# Patient Record
Sex: FEMALE | Race: WHITE | Employment: FULL TIME | URBAN - METROPOLITAN AREA
[De-identification: names, ages, dates, MRNs, and addresses within clinical notes are randomized per-mention and may not be internally consistent; named-entity substitution may affect disease eponyms.]

---

## 2018-03-17 ENCOUNTER — HOSPITAL ENCOUNTER (EMERGENCY)
Age: 53
Discharge: HOME | End: 2018-03-17
Attending: EMERGENCY MEDICINE
Payer: MEDICAID

## 2018-03-17 ENCOUNTER — APPOINTMENT (OUTPATIENT)
Dept: CT IMAGING | Age: 53
End: 2018-03-17
Attending: EMERGENCY MEDICINE
Payer: MEDICAID

## 2018-03-17 VITALS
HEIGHT: 69 IN | DIASTOLIC BLOOD PRESSURE: 64 MMHG | RESPIRATION RATE: 16 BRPM | SYSTOLIC BLOOD PRESSURE: 110 MMHG | TEMPERATURE: 98.6 F | BODY MASS INDEX: 30.76 KG/M2 | OXYGEN SATURATION: 98 % | HEART RATE: 98 BPM | WEIGHT: 207.67 LBS

## 2018-03-17 DIAGNOSIS — R10.31 RLQ ABDOMINAL PAIN: Primary | ICD-10-CM

## 2018-03-17 LAB
AMORPH CRY URNS QL MICRO: ABNORMAL
ANION GAP SERPL CALC-SCNC: 8 MMOL/L (ref 5–15)
APPEARANCE UR: ABNORMAL
BACTERIA URNS QL MICRO: ABNORMAL /HPF
BASOPHILS # BLD: 0 K/UL (ref 0–0.1)
BASOPHILS NFR BLD: 0 % (ref 0–1)
BILIRUB UR QL CFM: NEGATIVE
BUN SERPL-MCNC: 18 MG/DL (ref 6–20)
BUN/CREAT SERPL: 19 (ref 12–20)
CALCIUM SERPL-MCNC: 9.1 MG/DL (ref 8.5–10.1)
CHLORIDE SERPL-SCNC: 108 MMOL/L (ref 97–108)
CO2 SERPL-SCNC: 25 MMOL/L (ref 21–32)
COLOR UR: ABNORMAL
CREAT BLD-MCNC: 0.9 MG/DL (ref 0.6–1.3)
CREAT SERPL-MCNC: 0.95 MG/DL (ref 0.55–1.02)
DIFFERENTIAL METHOD BLD: ABNORMAL
EOSINOPHIL # BLD: 0.2 K/UL (ref 0–0.4)
EOSINOPHIL NFR BLD: 3 % (ref 0–7)
EPITH CASTS URNS QL MICRO: ABNORMAL /LPF
ERYTHROCYTE [DISTWIDTH] IN BLOOD BY AUTOMATED COUNT: 12 % (ref 11.5–14.5)
GLUCOSE SERPL-MCNC: 125 MG/DL (ref 65–100)
GLUCOSE UR STRIP.AUTO-MCNC: NEGATIVE MG/DL
HCT VFR BLD AUTO: 42.1 % (ref 35–47)
HGB BLD-MCNC: 14.4 G/DL (ref 11.5–16)
HGB UR QL STRIP: NEGATIVE
HYALINE CASTS URNS QL MICRO: ABNORMAL /LPF (ref 0–5)
IMM GRANULOCYTES # BLD: 0 K/UL (ref 0–0.04)
IMM GRANULOCYTES NFR BLD AUTO: 1 % (ref 0–0.5)
KETONES UR QL STRIP.AUTO: NEGATIVE MG/DL
LEUKOCYTE ESTERASE UR QL STRIP.AUTO: NEGATIVE
LYMPHOCYTES # BLD: 2.2 K/UL (ref 0.8–3.5)
LYMPHOCYTES NFR BLD: 29 % (ref 12–49)
MCH RBC QN AUTO: 32.1 PG (ref 26–34)
MCHC RBC AUTO-ENTMCNC: 34.2 G/DL (ref 30–36.5)
MCV RBC AUTO: 94 FL (ref 80–99)
MONOCYTES # BLD: 0.6 K/UL (ref 0–1)
MONOCYTES NFR BLD: 7 % (ref 5–13)
MUCOUS THREADS URNS QL MICRO: ABNORMAL /LPF
NEUTS SEG # BLD: 4.7 K/UL (ref 1.8–8)
NEUTS SEG NFR BLD: 60 % (ref 32–75)
NITRITE UR QL STRIP.AUTO: NEGATIVE
NRBC # BLD: 0 K/UL (ref 0–0.01)
NRBC BLD-RTO: 0 PER 100 WBC
PH UR STRIP: 5.5 [PH] (ref 5–8)
PLATELET # BLD AUTO: 271 K/UL (ref 150–400)
PMV BLD AUTO: 9.9 FL (ref 8.9–12.9)
POTASSIUM SERPL-SCNC: 3.7 MMOL/L (ref 3.5–5.1)
PROT UR STRIP-MCNC: ABNORMAL MG/DL
RBC # BLD AUTO: 4.48 M/UL (ref 3.8–5.2)
RBC #/AREA URNS HPF: ABNORMAL /HPF (ref 0–5)
SODIUM SERPL-SCNC: 141 MMOL/L (ref 136–145)
SP GR UR REFRACTOMETRY: 1.03 (ref 1–1.03)
UA: UC IF INDICATED,UAUC: ABNORMAL
UROBILINOGEN UR QL STRIP.AUTO: 0.2 EU/DL (ref 0.2–1)
WBC # BLD AUTO: 7.7 K/UL (ref 3.6–11)
WBC URNS QL MICRO: ABNORMAL /HPF (ref 0–4)

## 2018-03-17 PROCEDURE — 96374 THER/PROPH/DIAG INJ IV PUSH: CPT

## 2018-03-17 PROCEDURE — 82565 ASSAY OF CREATININE: CPT

## 2018-03-17 PROCEDURE — 85025 COMPLETE CBC W/AUTO DIFF WBC: CPT | Performed by: EMERGENCY MEDICINE

## 2018-03-17 PROCEDURE — 74011636320 HC RX REV CODE- 636/320: Performed by: EMERGENCY MEDICINE

## 2018-03-17 PROCEDURE — 36415 COLL VENOUS BLD VENIPUNCTURE: CPT | Performed by: EMERGENCY MEDICINE

## 2018-03-17 PROCEDURE — 74177 CT ABD & PELVIS W/CONTRAST: CPT

## 2018-03-17 PROCEDURE — 87086 URINE CULTURE/COLONY COUNT: CPT | Performed by: EMERGENCY MEDICINE

## 2018-03-17 PROCEDURE — 99284 EMERGENCY DEPT VISIT MOD MDM: CPT

## 2018-03-17 PROCEDURE — 81001 URINALYSIS AUTO W/SCOPE: CPT | Performed by: EMERGENCY MEDICINE

## 2018-03-17 PROCEDURE — 80048 BASIC METABOLIC PNL TOTAL CA: CPT | Performed by: EMERGENCY MEDICINE

## 2018-03-17 PROCEDURE — 74011250636 HC RX REV CODE- 250/636: Performed by: EMERGENCY MEDICINE

## 2018-03-17 RX ORDER — FENTANYL CITRATE 50 UG/ML
50 INJECTION, SOLUTION INTRAMUSCULAR; INTRAVENOUS
Status: COMPLETED | OUTPATIENT
Start: 2018-03-17 | End: 2018-03-17

## 2018-03-17 RX ORDER — NAPROXEN 500 MG/1
500 TABLET ORAL
Qty: 20 TAB | Refills: 0 | Status: SHIPPED | OUTPATIENT
Start: 2018-03-17 | End: 2018-10-03

## 2018-03-17 RX ORDER — SODIUM CHLORIDE 0.9 % (FLUSH) 0.9 %
10 SYRINGE (ML) INJECTION
Status: COMPLETED | OUTPATIENT
Start: 2018-03-17 | End: 2018-03-17

## 2018-03-17 RX ORDER — SODIUM CHLORIDE 9 MG/ML
50 INJECTION, SOLUTION INTRAVENOUS
Status: COMPLETED | OUTPATIENT
Start: 2018-03-17 | End: 2018-03-17

## 2018-03-17 RX ORDER — DICYCLOMINE HYDROCHLORIDE 10 MG/1
10 CAPSULE ORAL 4 TIMES DAILY
Qty: 20 CAP | Refills: 0 | Status: SHIPPED | OUTPATIENT
Start: 2018-03-17 | End: 2018-03-22

## 2018-03-17 RX ADMIN — SODIUM CHLORIDE 50 ML/HR: 900 INJECTION, SOLUTION INTRAVENOUS at 13:39

## 2018-03-17 RX ADMIN — FENTANYL CITRATE 50 MCG: 50 INJECTION, SOLUTION INTRAMUSCULAR; INTRAVENOUS at 14:16

## 2018-03-17 RX ADMIN — IOPAMIDOL 100 ML: 755 INJECTION, SOLUTION INTRAVENOUS at 13:39

## 2018-03-17 RX ADMIN — Medication 10 ML: at 13:39

## 2018-03-17 NOTE — ED PROVIDER NOTES
EMERGENCY DEPARTMENT HISTORY AND PHYSICAL EXAM      Date: 3/17/2018  Patient Name: Naveen Nolasco    History of Presenting Illness     Chief Complaint   Patient presents with    Abdominal Pain     pt reports to ed complaining of right lower quandrant pain onset this morning, no nausea       History Provided By: Patient    HPI: Naveen Nolasco, 50 y.o. female with PMHx significant for HTN and kidney stone, presents ambulatory to the ED with cc of 3/10, sharp, intermittent, RLQ abdominal pain since this morning at 8:00 AM. Pt notes that she took Hydrocodone with no relief of her symptoms. Pt explains that she has been having worsening RLQ abdominal since this morning upon waking. Pt reports that she has a Hx of a kidney stone, but she is unable to recall if she passed the stone. Pt notes that she also has a Hx of an ovarian cyst which ruptured several years ago. Pt specifically denies fever, N/V, diarrhea, constipation, or urinary symptoms. PCP: Kathleen Eric NP    There are no other complaints, changes, or physical findings at this time. Current Facility-Administered Medications   Medication Dose Route Frequency Provider Last Rate Last Dose    fentaNYL citrate (PF) injection 50 mcg  50 mcg IntraVENous NOW Grace Turk MD         Current Outpatient Prescriptions   Medication Sig Dispense Refill    dicyclomine (BENTYL) 10 mg capsule Take 1 Cap by mouth four (4) times daily for 5 days. 20 Cap 0    naproxen (NAPROSYN) 500 mg tablet Take 1 Tab by mouth every twelve (12) hours as needed for Pain. 20 Tab 0    ondansetron (ZOFRAN ODT) 4 mg disintegrating tablet Take 1 Tab by mouth every eight (8) hours as needed for Nausea. 10 Tab 0    lisinopril (PRINIVIL, ZESTRIL) 10 mg tablet Take 10 mg by mouth daily.  multivitamin (ONE A DAY) tablet Take 1 Tab by mouth daily.  oxyCODONE IR (ROXICODONE) 10 mg tab immediate release tablet Take 5 mg by mouth every six (6) hours as needed.       methylPREDNISolone (MEDROL, LEOBARDO,) 4 mg tablet Per dose pack instructions 1 Package 0    oxyCODONE-acetaminophen (PERCOCET) 5-325 mg per tablet Take 1 Tab by mouth every four (4) hours as needed for Pain. 20 Tab 0       Past History     Past Medical History:  Past Medical History:   Diagnosis Date    Hypertension        Past Surgical History:  Past Surgical History:   Procedure Laterality Date    HX ORTHOPAEDIC      metal plate in left lower leg    HX ORTHOPAEDIC      DDD       Family History:  History reviewed. No pertinent family history. Social History:  Social History   Substance Use Topics    Smoking status: Current Some Day Smoker     Packs/day: 1.00    Smokeless tobacco: Never Used    Alcohol use Yes      Comment: 3/week       Allergies:  No Known Allergies      Review of Systems   Review of Systems   Constitutional: Negative. Negative for chills, diaphoresis and fever. HENT: Negative. Negative for congestion, sore throat and trouble swallowing. Eyes: Negative. Negative for photophobia, pain and redness. Respiratory: Negative. Negative for cough, chest tightness, shortness of breath and wheezing. Cardiovascular: Negative. Negative for chest pain and palpitations. Gastrointestinal: Positive for abdominal pain. Negative for blood in stool, diarrhea, nausea and vomiting. Genitourinary: Negative for difficulty urinating, dysuria and frequency. Musculoskeletal: Negative. Negative for arthralgias, myalgias, neck pain and neck stiffness. Skin: Negative. Neurological: Negative. Negative for dizziness, tremors, seizures, syncope, speech difficulty, light-headedness and headaches. Psychiatric/Behavioral: Negative. Negative for confusion. The patient is not nervous/anxious. All other systems reviewed and are negative. Physical Exam   Physical Exam   Constitutional: She is oriented to person, place, and time. She appears well-developed and well-nourished.    HENT:   Head: Normocephalic and atraumatic. Eyes: Conjunctivae and EOM are normal.   Neck: Normal range of motion. Neck supple. Cardiovascular: Normal rate and regular rhythm. Pulmonary/Chest: Effort normal and breath sounds normal. No respiratory distress. Abdominal: Soft. She exhibits no distension. RLQ abdominal pain   Musculoskeletal: Normal range of motion. Neurological: She is alert and oriented to person, place, and time. Skin: Skin is warm and dry. Psychiatric: She has a normal mood and affect. Nursing note and vitals reviewed.         Diagnostic Study Results     Labs -     Recent Results (from the past 12 hour(s))   URINALYSIS W/ REFLEX CULTURE    Collection Time: 03/17/18 12:09 PM   Result Value Ref Range    Color DARK YELLOW      Appearance CLOUDY (A) CLEAR      Specific gravity 1.029 1.003 - 1.030      pH (UA) 5.5 5.0 - 8.0      Protein TRACE (A) NEG mg/dL    Glucose NEGATIVE  NEG mg/dL    Ketone NEGATIVE  NEG mg/dL    Blood NEGATIVE  NEG      Urobilinogen 0.2 0.2 - 1.0 EU/dL    Nitrites NEGATIVE  NEG      Leukocyte Esterase NEGATIVE  NEG      WBC 10-20 0 - 4 /hpf    RBC 5-10 0 - 5 /hpf    Epithelial cells MODERATE (A) FEW /lpf    Bacteria 4+ (A) NEG /hpf    UA:UC IF INDICATED URINE CULTURE ORDERED (A) CNI      Mucus 1+ (A) NEG /lpf    Amorphous Crystals 1+ (A) NEG    Hyaline cast 0-2 0 - 5 /lpf   BILIRUBIN, CONFIRM    Collection Time: 03/17/18 12:09 PM   Result Value Ref Range    Bilirubin UA, confirm NEGATIVE  NEG     CBC WITH AUTOMATED DIFF    Collection Time: 03/17/18 12:41 PM   Result Value Ref Range    WBC 7.7 3.6 - 11.0 K/uL    RBC 4.48 3.80 - 5.20 M/uL    HGB 14.4 11.5 - 16.0 g/dL    HCT 42.1 35.0 - 47.0 %    MCV 94.0 80.0 - 99.0 FL    MCH 32.1 26.0 - 34.0 PG    MCHC 34.2 30.0 - 36.5 g/dL    RDW 12.0 11.5 - 14.5 %    PLATELET 767 141 - 674 K/uL    MPV 9.9 8.9 - 12.9 FL    NRBC 0.0 0  WBC    ABSOLUTE NRBC 0.00 0.00 - 0.01 K/uL    NEUTROPHILS 60 32 - 75 %    LYMPHOCYTES 29 12 - 49 %    MONOCYTES 7 5 - 13 %    EOSINOPHILS 3 0 - 7 %    BASOPHILS 0 0 - 1 %    IMMATURE GRANULOCYTES 1 (H) 0.0 - 0.5 %    ABS. NEUTROPHILS 4.7 1.8 - 8.0 K/UL    ABS. LYMPHOCYTES 2.2 0.8 - 3.5 K/UL    ABS. MONOCYTES 0.6 0.0 - 1.0 K/UL    ABS. EOSINOPHILS 0.2 0.0 - 0.4 K/UL    ABS. BASOPHILS 0.0 0.0 - 0.1 K/UL    ABS. IMM. GRANS. 0.0 0.00 - 0.04 K/UL    DF AUTOMATED     METABOLIC PANEL, BASIC    Collection Time: 03/17/18 12:41 PM   Result Value Ref Range    Sodium 141 136 - 145 mmol/L    Potassium 3.7 3.5 - 5.1 mmol/L    Chloride 108 97 - 108 mmol/L    CO2 25 21 - 32 mmol/L    Anion gap 8 5 - 15 mmol/L    Glucose 125 (H) 65 - 100 mg/dL    BUN 18 6 - 20 MG/DL    Creatinine 0.95 0.55 - 1.02 MG/DL    BUN/Creatinine ratio 19 12 - 20      GFR est AA >60 >60 ml/min/1.73m2    GFR est non-AA >60 >60 ml/min/1.73m2    Calcium 9.1 8.5 - 10.1 MG/DL   POC CREATININE    Collection Time: 03/17/18 12:53 PM   Result Value Ref Range    Creatinine (POC) 0.9 0.6 - 1.3 MG/DL    GFRAA, POC >60 >60 ml/min/1.73m2    GFRNA, POC >60 >60 ml/min/1.73m2       Radiologic Studies -   CT ABD PELV W CONT   Final Result        CT Results  (Last 48 hours)               03/17/18 1338  CT ABD PELV W CONT Final result    Impression:  IMPRESSION:       1. No acute abnormality   2. Fatty liver with hypervascular lesions unchanged likely representing   hemangiomas           Narrative:  EXAM:  CT ABD PELV W CONT       INDICATION: Abdominal pain, RLQ       COMPARISON: April 22, 2014       CONTRAST:  100 mL of Isovue-370. TECHNIQUE:    Following the uneventful intravenous administration of contrast, thin axial   images were obtained through the abdomen and pelvis. Coronal and sagittal   reconstructions were generated. Oral contrast was not administered. CT dose   reduction was achieved through use of a standardized protocol tailored for this   examination and automatic exposure control for dose modulation. FINDINGS:    LUNG BASES: Clear.    INCIDENTALLY IMAGED HEART AND MEDIASTINUM: Unremarkable. LIVER: There is diffuse fatty infiltration of the liver. There are 2 partially   hypervascular lesions within the liver. One in segment 8 measuring 12 mm. One in   segment 6-7 measuring 19 mm. Unchanged favored represent flash hemangiomas. Probable cyst as well   GALLBLADDER: Unremarkable. SPLEEN: No mass. PANCREAS: No mass or ductal dilatation. ADRENALS: Unremarkable. KIDNEYS: Scarring right kidney. Small exophytic lesion right kidney too small to   characterize. No hydronephrosis   STOMACH: Unremarkable. SMALL BOWEL: No dilatation or wall thickening. COLON: No dilatation or wall thickening. APPENDIX: Unremarkable. PERITONEUM: No ascites or pneumoperitoneum. RETROPERITONEUM: No lymphadenopathy or aortic aneurysm. There are vascular   calcifications   REPRODUCTIVE ORGANS: Within normal limits   URINARY BLADDER: No mass or calculus. BONES: No destructive bone lesion. ADDITIONAL COMMENTS: N/A               Medical Decision Making   I am the first provider for this patient. I reviewed the vital signs, available nursing notes, past medical history, past surgical history, family history and social history. Vital Signs-Reviewed the patient's vital signs. Patient Vitals for the past 12 hrs:   Temp Pulse Resp BP SpO2   03/17/18 1315 - - - 110/64 -   03/17/18 1202 98.6 °F (37 °C) 98 16 147/78 98 %       Pulse Oximetry Analysis - 98% on RA    Cardiac Monitor:   Rate: 97 bpm  Rhythm: Normal Sinus Rhythm      Records Reviewed: Nursing Notes, Old Medical Records, Previous Radiology Studies and Previous Laboratory Studies    Provider Notes (Medical Decision Making):   Patient presents with RLQ abdominal pain. DDx: kidney stone, Appendicitis, colitis, malrotation, ovarian pathology, hernia. Will obtain UA, UPT, labwork and analgesics and determine if CT abdomen warranted. ED Course:   Initial assessment performed.  The patients presenting problems have been discussed, and they are in agreement with the care plan formulated and outlined with them. I have encouraged them to ask questions as they arise throughout their visit. Disposition:  2:04 PM  The patient has been re-evaluated and is ready for discharge. Reviewed available results with patient. Counseled patient on diagnosis and care plan. Patient has expressed understanding, and all questions have been answered. Patient agrees with plan and agrees to follow up as recommended, or return to the ED if their symptoms worsen. Discharge instructions have been provided and explained to the patient, along with reasons to return to the ED. PLAN:  1. Current Discharge Medication List      START taking these medications    Details   dicyclomine (BENTYL) 10 mg capsule Take 1 Cap by mouth four (4) times daily for 5 days. Qty: 20 Cap, Refills: 0      naproxen (NAPROSYN) 500 mg tablet Take 1 Tab by mouth every twelve (12) hours as needed for Pain. Qty: 20 Tab, Refills: 0           2. Follow-up Information     Follow up With Details Comments [address], NP Schedule an appointment as soon as possible for a visit As needed         Return to ED if worse     Diagnosis     Clinical Impression:   1. RLQ abdominal pain        Attestations: This note is prepared by Lluvia Jones, acting as Scribe for Consuelo Clay M.D. Consuelo Clay M.D: The scribe's documentation has been prepared under my direction and personally reviewed by me in its entirety. I confirm that the note above accurately reflects all work, treatment, procedures, and medical decision making performed by me.

## 2018-03-17 NOTE — ED NOTES
Dr Lauro Osgood reviewed discharge instructions with the patient. The patient verbalized understanding. All questions and concerns were addressed. The patient declined a wheelchair and is discharged ambulatory in the care of family members with instructions and prescriptions in hand. Pt is alert and oriented x 4. Respirations are clear and unlabored.

## 2018-03-17 NOTE — ED NOTES
Complaint of intermittent RLQ pain since about 0300 this morning. Pain radiates to the right flank. No urinary sx.   Pt took an oxycodone prior to arrival.  No n/v/d.

## 2018-03-17 NOTE — DISCHARGE INSTRUCTIONS
Abdominal Pain: Care Instructions  Your Care Instructions    Abdominal pain has many possible causes. Some aren't serious and get better on their own in a few days. Others need more testing and treatment. If your pain continues or gets worse, you need to be rechecked and may need more tests to find out what is wrong. You may need surgery to correct the problem. Don't ignore new symptoms, such as fever, nausea and vomiting, urination problems, pain that gets worse, and dizziness. These may be signs of a more serious problem. Your doctor may have recommended a follow-up visit in the next 8 to 12 hours. If you are not getting better, you may need more tests or treatment. The doctor has checked you carefully, but problems can develop later. If you notice any problems or new symptoms, get medical treatment right away. Follow-up care is a key part of your treatment and safety. Be sure to make and go to all appointments, and call your doctor if you are having problems. It's also a good idea to know your test results and keep a list of the medicines you take. How can you care for yourself at home? · Rest until you feel better. · To prevent dehydration, drink plenty of fluids, enough so that your urine is light yellow or clear like water. Choose water and other caffeine-free clear liquids until you feel better. If you have kidney, heart, or liver disease and have to limit fluids, talk with your doctor before you increase the amount of fluids you drink. · If your stomach is upset, eat mild foods, such as rice, dry toast or crackers, bananas, and applesauce. Try eating several small meals instead of two or three large ones. · Wait until 48 hours after all symptoms have gone away before you have spicy foods, alcohol, and drinks that contain caffeine. · Do not eat foods that are high in fat. · Avoid anti-inflammatory medicines such as aspirin, ibuprofen (Advil, Motrin), and naproxen (Aleve).  These can cause stomach upset. Talk to your doctor if you take daily aspirin for another health problem. When should you call for help? Call 911 anytime you think you may need emergency care. For example, call if:  ? · You passed out (lost consciousness). ? · You pass maroon or very bloody stools. ? · You vomit blood or what looks like coffee grounds. ? · You have new, severe belly pain. ?Call your doctor now or seek immediate medical care if:  ? · Your pain gets worse, especially if it becomes focused in one area of your belly. ? · You have a new or higher fever. ? · Your stools are black and look like tar, or they have streaks of blood. ? · You have unexpected vaginal bleeding. ? · You have symptoms of a urinary tract infection. These may include:  ¨ Pain when you urinate. ¨ Urinating more often than usual.  ¨ Blood in your urine. ? · You are dizzy or lightheaded, or you feel like you may faint. ? Watch closely for changes in your health, and be sure to contact your doctor if:  ? · You are not getting better after 1 day (24 hours). Where can you learn more? Go to http://winnie-levy.info/. Enter H291 in the search box to learn more about \"Abdominal Pain: Care Instructions. \"  Current as of: March 20, 2017  Content Version: 11.4  © 3944-7087 [employer]. Care instructions adapted under license by [employer] (which disclaims liability or warranty for this information). If you have questions about a medical condition or this instruction, always ask your healthcare professional. [place] [address] any warranty or liability for your use of this information.

## 2018-03-18 LAB
BACTERIA SPEC CULT: NORMAL
CC UR VC: NORMAL
SERVICE CMNT-IMP: NORMAL

## 2018-10-03 ENCOUNTER — HOSPITAL ENCOUNTER (EMERGENCY)
Age: 53
Discharge: HOME | End: 2018-10-03
Attending: EMERGENCY MEDICINE
Payer: MEDICAID

## 2018-10-03 ENCOUNTER — APPOINTMENT (OUTPATIENT)
Dept: ULTRASOUND IMAGING | Age: 53
End: 2018-10-03
Payer: MEDICAID

## 2018-10-03 VITALS
HEART RATE: 86 BPM | SYSTOLIC BLOOD PRESSURE: 191 MMHG | TEMPERATURE: 98.4 F | BODY MASS INDEX: 28.73 KG/M2 | OXYGEN SATURATION: 97 % | HEIGHT: 69 IN | DIASTOLIC BLOOD PRESSURE: 91 MMHG | WEIGHT: 194 LBS | RESPIRATION RATE: 16 BRPM

## 2018-10-03 DIAGNOSIS — R10.11 ABDOMINAL PAIN, RIGHT UPPER QUADRANT: Primary | ICD-10-CM

## 2018-10-03 DIAGNOSIS — R11.2 NON-INTRACTABLE VOMITING WITH NAUSEA, UNSPECIFIED VOMITING TYPE: ICD-10-CM

## 2018-10-03 DIAGNOSIS — K80.20 CALCULUS OF GALLBLADDER WITHOUT CHOLECYSTITIS WITHOUT OBSTRUCTION: ICD-10-CM

## 2018-10-03 LAB
ALBUMIN SERPL-MCNC: 3.9 G/DL (ref 3.5–5)
ALBUMIN/GLOB SERPL: 1.1 {RATIO} (ref 1.1–2.2)
ALP SERPL-CCNC: 90 U/L (ref 45–117)
ALT SERPL-CCNC: 24 U/L (ref 12–78)
ANION GAP SERPL CALC-SCNC: 8 MMOL/L (ref 5–15)
APPEARANCE UR: CLEAR
AST SERPL-CCNC: 13 U/L (ref 15–37)
BACTERIA URNS QL MICRO: ABNORMAL /HPF
BASOPHILS # BLD: 0 K/UL (ref 0–0.1)
BASOPHILS NFR BLD: 1 % (ref 0–1)
BILIRUB SERPL-MCNC: 1 MG/DL (ref 0.2–1)
BILIRUB UR QL: NEGATIVE
BUN SERPL-MCNC: 13 MG/DL (ref 6–20)
BUN/CREAT SERPL: 17 (ref 12–20)
CALCIUM SERPL-MCNC: 9.2 MG/DL (ref 8.5–10.1)
CHLORIDE SERPL-SCNC: 104 MMOL/L (ref 97–108)
CO2 SERPL-SCNC: 25 MMOL/L (ref 21–32)
COLOR UR: ABNORMAL
CREAT SERPL-MCNC: 0.78 MG/DL (ref 0.55–1.02)
DIFFERENTIAL METHOD BLD: NORMAL
EOSINOPHIL # BLD: 0.2 K/UL (ref 0–0.4)
EOSINOPHIL NFR BLD: 3 % (ref 0–7)
EPITH CASTS URNS QL MICRO: ABNORMAL /LPF
ERYTHROCYTE [DISTWIDTH] IN BLOOD BY AUTOMATED COUNT: 11.9 % (ref 11.5–14.5)
GLOBULIN SER CALC-MCNC: 3.4 G/DL (ref 2–4)
GLUCOSE SERPL-MCNC: 109 MG/DL (ref 65–100)
GLUCOSE UR STRIP.AUTO-MCNC: NEGATIVE MG/DL
HCG UR QL: NEGATIVE
HCT VFR BLD AUTO: 46.1 % (ref 35–47)
HGB BLD-MCNC: 15.9 G/DL (ref 11.5–16)
HGB UR QL STRIP: NEGATIVE
HYALINE CASTS URNS QL MICRO: ABNORMAL /LPF (ref 0–5)
IMM GRANULOCYTES # BLD: 0 K/UL (ref 0–0.04)
IMM GRANULOCYTES NFR BLD AUTO: 0 % (ref 0–0.5)
KETONES UR QL STRIP.AUTO: NEGATIVE MG/DL
LEUKOCYTE ESTERASE UR QL STRIP.AUTO: NEGATIVE
LIPASE SERPL-CCNC: 99 U/L (ref 73–393)
LYMPHOCYTES # BLD: 2.1 K/UL (ref 0.8–3.5)
LYMPHOCYTES NFR BLD: 25 % (ref 12–49)
MCH RBC QN AUTO: 31.8 PG (ref 26–34)
MCHC RBC AUTO-ENTMCNC: 34.5 G/DL (ref 30–36.5)
MCV RBC AUTO: 92.2 FL (ref 80–99)
MONOCYTES # BLD: 0.5 K/UL (ref 0–1)
MONOCYTES NFR BLD: 7 % (ref 5–13)
NEUTS SEG # BLD: 5.3 K/UL (ref 1.8–8)
NEUTS SEG NFR BLD: 65 % (ref 32–75)
NITRITE UR QL STRIP.AUTO: NEGATIVE
NRBC # BLD: 0 K/UL (ref 0–0.01)
NRBC BLD-RTO: 0 PER 100 WBC
PH UR STRIP: 6.5 [PH] (ref 5–8)
PLATELET # BLD AUTO: 282 K/UL (ref 150–400)
PMV BLD AUTO: 9.9 FL (ref 8.9–12.9)
POTASSIUM SERPL-SCNC: 3.7 MMOL/L (ref 3.5–5.1)
PROT SERPL-MCNC: 7.3 G/DL (ref 6.4–8.2)
PROT UR STRIP-MCNC: NEGATIVE MG/DL
RBC # BLD AUTO: 5 M/UL (ref 3.8–5.2)
RBC #/AREA URNS HPF: ABNORMAL /HPF (ref 0–5)
SODIUM SERPL-SCNC: 137 MMOL/L (ref 136–145)
SP GR UR REFRACTOMETRY: 1.01 (ref 1–1.03)
UA: UC IF INDICATED,UAUC: ABNORMAL
UROBILINOGEN UR QL STRIP.AUTO: 0.2 EU/DL (ref 0.2–1)
WBC # BLD AUTO: 8.2 K/UL (ref 3.6–11)
WBC URNS QL MICRO: ABNORMAL /HPF (ref 0–4)

## 2018-10-03 PROCEDURE — 81001 URINALYSIS AUTO W/SCOPE: CPT | Performed by: PHYSICIAN ASSISTANT

## 2018-10-03 PROCEDURE — 87086 URINE CULTURE/COLONY COUNT: CPT | Performed by: PHYSICIAN ASSISTANT

## 2018-10-03 PROCEDURE — 81025 URINE PREGNANCY TEST: CPT

## 2018-10-03 PROCEDURE — 76705 ECHO EXAM OF ABDOMEN: CPT

## 2018-10-03 PROCEDURE — 85025 COMPLETE CBC W/AUTO DIFF WBC: CPT | Performed by: PHYSICIAN ASSISTANT

## 2018-10-03 PROCEDURE — 83690 ASSAY OF LIPASE: CPT | Performed by: PHYSICIAN ASSISTANT

## 2018-10-03 PROCEDURE — 80053 COMPREHEN METABOLIC PANEL: CPT | Performed by: PHYSICIAN ASSISTANT

## 2018-10-03 PROCEDURE — 99283 EMERGENCY DEPT VISIT LOW MDM: CPT

## 2018-10-03 PROCEDURE — 96360 HYDRATION IV INFUSION INIT: CPT

## 2018-10-03 PROCEDURE — 36415 COLL VENOUS BLD VENIPUNCTURE: CPT | Performed by: PHYSICIAN ASSISTANT

## 2018-10-03 PROCEDURE — 74011250636 HC RX REV CODE- 250/636: Performed by: PHYSICIAN ASSISTANT

## 2018-10-03 RX ORDER — SODIUM CHLORIDE 0.9 % (FLUSH) 0.9 %
5-10 SYRINGE (ML) INJECTION AS NEEDED
Status: DISCONTINUED | OUTPATIENT
Start: 2018-10-03 | End: 2018-10-03 | Stop reason: HOSPADM

## 2018-10-03 RX ORDER — HYDROCODONE BITARTRATE AND ACETAMINOPHEN 5; 325 MG/1; MG/1
1 TABLET ORAL
Qty: 10 TAB | Refills: 0 | Status: SHIPPED | OUTPATIENT
Start: 2018-10-03 | End: 2018-10-13

## 2018-10-03 RX ORDER — ONDANSETRON 4 MG/1
4 TABLET, ORALLY DISINTEGRATING ORAL
Qty: 15 TAB | Refills: 0 | Status: SHIPPED | OUTPATIENT
Start: 2018-10-03

## 2018-10-03 RX ORDER — PROMETHAZINE HYDROCHLORIDE 25 MG/1
25 TABLET ORAL
Qty: 20 TAB | Refills: 0 | Status: SHIPPED | OUTPATIENT
Start: 2018-10-03 | End: 2019-12-04

## 2018-10-03 RX ORDER — SODIUM CHLORIDE 0.9 % (FLUSH) 0.9 %
5-10 SYRINGE (ML) INJECTION EVERY 8 HOURS
Status: DISCONTINUED | OUTPATIENT
Start: 2018-10-03 | End: 2018-10-03 | Stop reason: HOSPADM

## 2018-10-03 RX ADMIN — SODIUM CHLORIDE 1000 ML: 900 INJECTION, SOLUTION INTRAVENOUS at 13:20

## 2018-10-03 NOTE — DISCHARGE INSTRUCTIONS
Abdominal Pain: Care Instructions  Your Care Instructions    Abdominal pain has many possible causes. Some aren't serious and get better on their own in a few days. Others need more testing and treatment. If your pain continues or gets worse, you need to be rechecked and may need more tests to find out what is wrong. You may need surgery to correct the problem. Don't ignore new symptoms, such as fever, nausea and vomiting, urination problems, pain that gets worse, and dizziness. These may be signs of a more serious problem. Your doctor may have recommended a follow-up visit in the next 8 to 12 hours. If you are not getting better, you may need more tests or treatment. The doctor has checked you carefully, but problems can develop later. If you notice any problems or new symptoms, get medical treatment right away. Follow-up care is a key part of your treatment and safety. Be sure to make and go to all appointments, and call your doctor if you are having problems. It's also a good idea to know your test results and keep a list of the medicines you take. How can you care for yourself at home? · Rest until you feel better. · To prevent dehydration, drink plenty of fluids, enough so that your urine is light yellow or clear like water. Choose water and other caffeine-free clear liquids until you feel better. If you have kidney, heart, or liver disease and have to limit fluids, talk with your doctor before you increase the amount of fluids you drink. · If your stomach is upset, eat mild foods, such as rice, dry toast or crackers, bananas, and applesauce. Try eating several small meals instead of two or three large ones. · Wait until 48 hours after all symptoms have gone away before you have spicy foods, alcohol, and drinks that contain caffeine. · Do not eat foods that are high in fat. · Avoid anti-inflammatory medicines such as aspirin, ibuprofen (Advil, Motrin), and naproxen (Aleve).  These can cause stomach upset. Talk to your doctor if you take daily aspirin for another health problem. When should you call for help? Call 911 anytime you think you may need emergency care. For example, call if:    · You passed out (lost consciousness).     · You pass maroon or very bloody stools.     · You vomit blood or what looks like coffee grounds.     · You have new, severe belly pain.    Call your doctor now or seek immediate medical care if:    · Your pain gets worse, especially if it becomes focused in one area of your belly.     · You have a new or higher fever.     · Your stools are black and look like tar, or they have streaks of blood.     · You have unexpected vaginal bleeding.     · You have symptoms of a urinary tract infection. These may include:  ¨ Pain when you urinate. ¨ Urinating more often than usual.  ¨ Blood in your urine.     · You are dizzy or lightheaded, or you feel like you may faint.    Watch closely for changes in your health, and be sure to contact your doctor if:    · You are not getting better after 1 day (24 hours). Where can you learn more? Go to http://winnie-levy.info/. Enter L613 in the search box to learn more about \"Abdominal Pain: Care Instructions. \"  Current as of: November 20, 2017  Content Version: 11.8  © 2905-3663 [employer]. Care instructions adapted under license by [employer] (which disclaims liability or warranty for this information). If you have questions about a medical condition or this instruction, always ask your healthcare professional. [place] [address] any warranty or liability for your use of this information. Learning About Gallstones  What are gallstones? Gallstones are stones that form in the gallbladder. The gallbladder is a small sac located just under the liver. It stores bile released by the liver. Bile helps you digest fats.  Gallstones can be smaller than a grain of sand or as large as a golf ball. Gallstones form when cholesterol and other things found in bile make stones. They can also form if the gallbladder doesn't empty as it should. Gallstones can also form in the common bile duct or cystic duct. These tubes carry bile from the gallbladder and the liver to the small intestine. What happens when you have gallstones? Gallstones can cause many different problems, such as:  · A blockage in the common bile duct. · Inflammation or infection of the gallbladder (acute cholecystitis). This can happen when a gallstone blocks the cystic duct. · Inflammation or infection of the common bile duct (cholangitis). This can happen when gallstones get stuck in the common bile duct. In rare cases, this can damage the liver or spread infection. · Pancreatitis, an inflammation of the pancreas. What are the symptoms? · Most people who have gallstones don't have symptoms. · Symptoms can include:  ¨ Mild pain in the pit of your stomach or in the upper right part of your belly. It may spread to your right upper back or shoulder blade area. ¨ Severe pain that may come and go or be steady. It may get worse when you eat. ¨ Fever and chills, if a gallstone is blocking a bile duct and causing an infection. ¨ Yellowing of your skin and the whites of your eyes. How can you prevent gallstones? There is no sure way to prevent gallstones. But you can reduce your risk of forming gallstones that can cause symptoms. · Stay at a healthy weight. If you need to lose weight, do so slowly and sensibly. · Eat regular, balanced meals. · Be active, and exercise regularly. How are gallstones treated? · If you don't have symptoms, you probably don't need treatment. · For mild symptoms, your doctor may have you take pain medicine and wait to see if the pain goes away. · For severe pain or infection, or if you have more than one gallstone attack, your doctor may suggest surgery to have your gallbladder removed.  The body works fine without a gallbladder. Follow-up care is a key part of your treatment and safety. Be sure to make and go to all appointments, and call your doctor if you are having problems. It's also a good idea to know your test results and keep a list of the medicines you take. Where can you learn more? Go to http://winnie-levy.info/. Enter  in the search box to learn more about \"Learning About Gallstones. \"  Current as of: March 28, 2018  Content Version: 11.8  © 7123-8811 [employer]. Care instructions adapted under license by [employer] (which disclaims liability or warranty for this information). If you have questions about a medical condition or this instruction, always ask your healthcare professional. [address] any warranty or liability for your use of this information. Low-Fat Diet for Gallbladder Disease: Care Instructions  Your Care Instructions    When you eat, the gallbladder releases bile, which helps you digest the fat in food. If you have an inflamed gallbladder, this may cause pain. A low-fat diet may give your gallbladder a rest so you can start to heal. Your doctor and dietitian can help you make an eating plan that does not irritate your digestive system. Always talk with your doctor or dietitian before you make changes in your diet. Follow-up care is a key part of your treatment and safety. Be sure to make and go to all appointments, and call your doctor if you are having problems. It's also a good idea to know your test results and keep a list of the medicines you take. How can you care for yourself at home? · Eat many small meals and snacks each day instead of three large meals. · Choose lean meats. ¨ Eat no more than 5 to 6½ ounces of meat a day. ¨ Cut off all fat you can see. ¨ Eat chicken and turkey without the skin.   ¨ Many types of fish, such as salmon, lake trout, tuna, and herring, provide healthy omega-3 fat. But, avoid fish canned in oil, such as sardines in olive oil. ¨ Bake, broil, or grill meats, poultry, or fish instead of frying them in butter or fat. · Drink or eat nonfat or low-fat milk, yogurt, cheese, or other milk products each day. ¨ Read the labels on cheeses, and choose those with less than 5 grams of fat an ounce. ¨ Try fat-free sour cream, cream cheese, or yogurt. ¨ Avoid cream soups and cream sauces on pasta. ¨ Eat low-fat ice cream, frozen yogurt, or sorbet. Avoid regular ice cream.  · Eat whole-grain cereals, breads, crackers, rice, or pasta. Avoid high-fat foods such as croissants, scones, biscuits, waffles, doughnuts, muffins, granola, and high-fat breads. · Flavor your foods with herbs and spices (such as basil, tarragon, or mint), fat-free sauces, or lemon juice instead of butter. You can also use butter substitutes, fat-free mayonnaise, or fat-free dressing. · Try applesauce, prune puree, or mashed bananas to replace some or all of the fat when you bake. · Limit fats and oils, such as butter, margarine, mayonnaise, and salad dressing, to no more than 1 tablespoon a meal.  · Avoid high-fat foods, such as:  ¨ Chocolate, whole milk, ice cream, and processed cheese. ¨ Fried or buttered foods. ¨ Sausage, salami, and hanks. ¨ Cinnamon rolls, cakes, pies, cookies, and other pastries. ¨ Prepared snack foods, such as potato chips, nut and granola bars, and mixed nuts. ¨ Coconut and avocado. · Learn how to read food labels for serving sizes and ingredients. Fast-food and convenience-food meals often have lots of fat. Where can you learn more? Go to http://winnie-levy.info/. Enter G734 in the search box to learn more about \"Low-Fat Diet for Gallbladder Disease: Care Instructions. \"  Current as of: May 12, 2017  Content Version: 11.7  © 9702-3253 [employer].  Care instructions adapted under license by [employer] (which disclaims liability or warranty for this information). If you have questions about a medical condition or this instruction, always ask your healthcare professional. [address] any warranty or liability for your use of this information.

## 2018-10-03 NOTE — ED TRIAGE NOTES
Assumed care of pt from triage. Pt is A&O x 4. Pt reports CC of RUQ pain with N/V x 1 week. Pt reports she called a GI MD and cannot get in until Nov. 5th. Pt reports pain is intermittent and when it gets bad she gets nauseous. Pt resting on stretcher in POC and placed on monitor x 2.

## 2018-10-03 NOTE — ED PROVIDER NOTES
EMERGENCY DEPARTMENT HISTORY AND PHYSICAL EXAM 
 
 
Date: 10/3/2018 Patient Name: Haley Brown History of Presenting Illness Chief Complaint Patient presents with  Abdominal Pain Pt c/o abdominal pain with nausea and vomiting x 1 week. Unable to see GI until November 5th  Nausea  Vomiting History Provided By: Patient HPI: Haley Brown, 50 y.o. female presents ambulatory to the ER with c/o RUQ abdominal pain and vomiting x 1 week. Pt with two episodes of vomiting today. No fever/chills. No h/o similar sx in the past but has been told previously she has Crohns dz but did not follow up. She states she has an appt with a Gastroenterologist, but cannot see them until 11/5/18. Pt denied chance of pregnancy, but is still having normal menstrual cycles. Denied ill contacts. Normal BMs. No BRBPR/melena. Pt denied dysuria/hematuria. Pt states symptoms are not made worse with eating but admits to having a depressed appetite. No rash/lesion. No chest pain, shortness of breath, cough. Chief Complaint: abd pain, N/V Duration: 1 Weeks Timing:  Acute Location: RUQ/epigrastium Quality: Aching Severity: 8 out of 10 Modifying Factors: no exacerbating/alleviating features Associated Symptoms: nausea/vomiting unrelated to eating; however, patient with decreased appetite There are no other complaints, changes, or physical findings at this time. PCP: Bertha Chaves NP Current Facility-Administered Medications Medication Dose Route Frequency Provider Last Rate Last Dose  sodium chloride (NS) flush 5-10 mL  5-10 mL IntraVENous [address], [place]      
 sodium chloride (NS) flush 5-10 mL  5-10 mL IntraVENous PRN Laz[facility] [place] Current Outpatient Prescriptions Medication Sig Dispense Refill  
 HYDROcodone-acetaminophen (NORCO) 5-325 mg per tablet Take 1 Tab by mouth every six (6) hours as needed for Pain for up to 10 days.  Max Daily Amount: 4 Tabs. 10 Tab 0  
 ondansetron (ZOFRAN ODT) 4 mg disintegrating tablet Take 1 Tab by mouth every eight (8) hours as needed for Nausea for up to 15 doses. 15 Tab 0  promethazine (PHENERGAN) 25 mg tablet Take 1 Tab by mouth every six (6) hours as needed for Nausea for up to 20 doses. 20 Tab 0  
 multivitamin (ONE A DAY) tablet Take 1 Tab by mouth daily. Past History Past Medical History: 
Past Medical History:  
Diagnosis Date  Hypertension Past Surgical History: 
Past Surgical History:  
Procedure Laterality Date  HX ORTHOPAEDIC    
 metal plate in left lower leg  HX ORTHOPAEDIC    
 DDD Family History: 
History reviewed. No pertinent family history. Social History: 
Social History Substance Use Topics  Smoking status: Current Every Day Smoker Packs/day: 1.00  Smokeless tobacco: Never Used  Alcohol use Yes Comment: 3/week Allergies: 
No Known Allergies Review of Systems Review of Systems Constitutional: Negative for chills and fever. HENT: Negative for congestion, rhinorrhea and sore throat. Eyes: Negative for photophobia and visual disturbance. Respiratory: Negative for cough and shortness of breath. Cardiovascular: Negative for chest pain and palpitations. Gastrointestinal: Positive for abdominal pain, nausea and vomiting. Negative for blood in stool and diarrhea. Endocrine: Negative for polydipsia, polyphagia and polyuria. Genitourinary: Negative for decreased urine volume, dysuria, hematuria, pelvic pain, vaginal bleeding, vaginal discharge and vaginal pain. Musculoskeletal: Negative for neck pain and neck stiffness. Skin: Negative for rash and wound. Allergic/Immunologic: Negative for food allergies and immunocompromised state. Neurological: Negative for dizziness, weakness and headaches. Psychiatric/Behavioral: Negative for agitation and confusion.   
 
 
Physical Exam  
Physical Exam  
 Constitutional: She is oriented to person, place, and time. She appears well-developed and well-nourished. No distress. WDWN female, alert, in moderate discomfort HENT:  
Head: Normocephalic and atraumatic. Nose: Nose normal.  
Mouth/Throat: Oropharynx is clear and moist. No oropharyngeal exudate. Eyes: Conjunctivae and EOM are normal. Right eye exhibits no discharge. Left eye exhibits no discharge. No scleral icterus. Neck: Normal range of motion. Neck supple. No JVD present. No tracheal deviation present. No thyromegaly present. Cardiovascular: Normal rate, regular rhythm and normal heart sounds. Pulmonary/Chest: Effort normal and breath sounds normal. No respiratory distress. She has no wheezes. Abdominal: Soft. Bowel sounds are normal. She exhibits no distension. There is tenderness. There is no rebound and no guarding. Moderate RUQ/epigastric tenderness, +English's sign, no CVAT Musculoskeletal: Normal range of motion. She exhibits no edema. Lymphadenopathy:  
  She has no cervical adenopathy. Neurological: She is alert and oriented to person, place, and time. She exhibits normal muscle tone. Coordination normal.  
Skin: Skin is warm and dry. She is not diaphoretic. No pallor. Psychiatric: She has a normal mood and affect. Her behavior is normal. Judgment normal.  
Nursing note and vitals reviewed. Diagnostic Study Results Labs - Recent Results (from the past 12 hour(s)) CBC WITH AUTOMATED DIFF Collection Time: 10/03/18 11:43 AM  
Result Value Ref Range WBC 8.2 3.6 - 11.0 K/uL  
 RBC 5.00 3.80 - 5.20 M/uL  
 HGB 15.9 11.5 - 16.0 g/dL HCT 46.1 35.0 - 47.0 % MCV 92.2 80.0 - 99.0 FL  
 MCH 31.8 26.0 - 34.0 PG  
 MCHC 34.5 30.0 - 36.5 g/dL  
 RDW 11.9 11.5 - 14.5 % PLATELET 545 140 - 318 K/uL MPV 9.9 8.9 - 12.9 FL  
 NRBC 0.0 0  WBC ABSOLUTE NRBC 0.00 0.00 - 0.01 K/uL NEUTROPHILS 65 32 - 75 % LYMPHOCYTES 25 12 - 49 % MONOCYTES 7 5 - 13 % EOSINOPHILS 3 0 - 7 % BASOPHILS 1 0 - 1 % IMMATURE GRANULOCYTES 0 0.0 - 0.5 % ABS. NEUTROPHILS 5.3 1.8 - 8.0 K/UL  
 ABS. LYMPHOCYTES 2.1 0.8 - 3.5 K/UL  
 ABS. MONOCYTES 0.5 0.0 - 1.0 K/UL  
 ABS. EOSINOPHILS 0.2 0.0 - 0.4 K/UL  
 ABS. BASOPHILS 0.0 0.0 - 0.1 K/UL  
 ABS. IMM. GRANS. 0.0 0.00 - 0.04 K/UL  
 DF AUTOMATED METABOLIC PANEL, COMPREHENSIVE Collection Time: 10/03/18 11:43 AM  
Result Value Ref Range Sodium 137 136 - 145 mmol/L Potassium 3.7 3.5 - 5.1 mmol/L Chloride 104 97 - 108 mmol/L  
 CO2 25 21 - 32 mmol/L Anion gap 8 5 - 15 mmol/L Glucose 109 (H) 65 - 100 mg/dL BUN 13 6 - 20 MG/DL Creatinine 0.78 0.55 - 1.02 MG/DL  
 BUN/Creatinine ratio 17 12 - 20 GFR est AA >60 >60 ml/min/1.73m2 GFR est non-AA >60 >60 ml/min/1.73m2 Calcium 9.2 8.5 - 10.1 MG/DL Bilirubin, total 1.0 0.2 - 1.0 MG/DL  
 ALT (SGPT) 24 12 - 78 U/L  
 AST (SGOT) 13 (L) 15 - 37 U/L Alk. phosphatase 90 45 - 117 U/L Protein, total 7.3 6.4 - 8.2 g/dL Albumin 3.9 3.5 - 5.0 g/dL Globulin 3.4 2.0 - 4.0 g/dL A-G Ratio 1.1 1.1 - 2.2 LIPASE Collection Time: 10/03/18 11:43 AM  
Result Value Ref Range Lipase 99 73 - 393 U/L  
URINALYSIS W/ REFLEX CULTURE Collection Time: 10/03/18  1:51 PM  
Result Value Ref Range Color YELLOW/STRAW Appearance CLEAR CLEAR Specific gravity 1.011 1.003 - 1.030    
 pH (UA) 6.5 5.0 - 8.0 Protein NEGATIVE  NEG mg/dL Glucose NEGATIVE  NEG mg/dL Ketone NEGATIVE  NEG mg/dL Bilirubin NEGATIVE  NEG Blood NEGATIVE  NEG Urobilinogen 0.2 0.2 - 1.0 EU/dL Nitrites NEGATIVE  NEG Leukocyte Esterase NEGATIVE  NEG    
 WBC 0-4 0 - 4 /hpf  
 RBC 0-5 0 - 5 /hpf Epithelial cells MODERATE (A) FEW /lpf Bacteria 2+ (A) NEG /hpf  
 UA:UC IF INDICATED URINE CULTURE ORDERED (A) CNI Hyaline cast 0-2 0 - 5 /lpf  
HCG URINE, QL. - POC  Collection Time: 10/03/18  1:53 PM  
 Result Value Ref Range Pregnancy test,urine (POC) NEGATIVE  NEG Radiologic Studies -  
US ABD LTD Final Result  
  
 
  
  US ABD LTD (Final result) Result time: 10/03/18 13:24:00  
  Final result by Ronald Alarcon Results In (10/03/18 13:18:21)  
  Initial Result:  
  Impression:  
  IMPRESSION:  
1. Cholelithiasis without evidence of cholecystitis. 2. Hepatic steatosis. Grossly stable hepatic cysts and hemangiomas when compared 
to CT abdomen pelvis 3/17/2018. 3. Nonobstructing right nephrolithiasis. 
  
  Narrative:  
  EXAM:  US ABD LTD INDICATION: Abdominal pain; r/o cholelithiasis, cholecystitis COMPARISON: CT abdomen pelvis 3/17/2018. TECHNIQUE:  
Real-time sonography of the abdomen was performed with multiple static images of 
the liver, gallbladder, pancreas, and right kidney obtained. FINDINGS: 
LIVER:  
The liver is diffusely increased in echogenicity. There is a hypoechoic lesion 
seen in the right hepatic lobe measuring 1.2 x 1.0 x 1.3 cm. There is a cyst in 
the left hepatic lobe measuring 9 mm. A another hypoechoic lesion in the mid 
liver junction of the right and left hepatic lobes measures 2.0 x 2.8 x 1.7 cm. LIVER VASCULATURE: The portal vein flow is patent with appropriate directional flow. Dena Johnson GALLBLADDER: 
The gallbladder contains multiple subcentimeter stones.  There is no wall 
thickening or fluid around the gallbladder. COMMON BILE DUCT: 
There is no biliary duct dilatation and the common duct measures 4 mm in 
diameter. PANCREAS: 
The visualized pancreas is normal. 
 
RIGHT KIDNEY: 
The right kidney measures 11.5 cm in length. The right kidney demonstrates 
normal echogenicity with a 9 mm simple cyst noted. There are a few small 
subcentimeter nonobstructing stones noted measuring up to 7 mm. No 
hydronephrosis.     
   
 
 
 
 
Medical Decision Making I am the first provider for this patient. I reviewed the vital signs, available nursing notes, past medical history, past surgical history, family history and social history. Vital Signs-Reviewed the patient's vital signs. Patient Vitals for the past 12 hrs: 
 Temp Pulse Resp BP SpO2  
10/03/18 1131 - - - - 97 % 10/03/18 1057 98.4 °F (36.9 °C) 86 16 (!) 191/91 100 % Records Reviewed: Nursing Notes, Old Medical Records, Previous Radiology Studies and Previous Laboratory Studies Provider Notes (Medical Decision Making):  
Cholecystitis, cholelithiasis, gastritis, GERD, UTI, electrolyte dysfunction ED Course:  
Initial assessment performed. The patients presenting problems have been discussed, and they are in agreement with the care plan formulated and outlined with them. I have encouraged them to ask questions as they arise throughout their visit. DISCHARGE NOTE: 
The care plan has been outline with the patient and/or family, who verbally conveyed understanding and agreement. Available results have been reviewed. Patient and/or family understand the follow up plan as outlined and discharge instructions. Should their condition deterioration at any time after discharge the patient agrees to return, follow up sooner than outlined or seek medical assistance at the closest Emergency Room as soon as possible. Questions have been answered. Discharge instructions and educational information regarding the patient's diagnosis as well a list of reasons why the patient would want to seek immediate medical attention, should their condition change, were reviewed directly with the patient/family PLAN: 
1. Current Discharge Medication List  
  
START taking these medications Details HYDROcodone-acetaminophen (NORCO) 5-325 mg per tablet Take 1 Tab by mouth every six (6) hours as needed for Pain for up to 10 days. Max Daily Amount: 4 Tabs. Qty: 10 Tab, Refills: 0 Associated Diagnoses: Abdominal pain, right upper quadrant ondansetron (ZOFRAN ODT) 4 mg disintegrating tablet Take 1 Tab by mouth every eight (8) hours as needed for Nausea for up to 15 doses. Qty: 15 Tab, Refills: 0  
  
promethazine (PHENERGAN) 25 mg tablet Take 1 Tab by mouth every six (6) hours as needed for Nausea for up to 20 doses. Qty: 20 Tab, Refills: 0  
  
  
 
2. Follow-up Information Follow up With Details Comments Contact Info YENNY Garcia MD   [address] [place] [address] [address] [place] [address] 
[phone] [facility] EMERGENCY DEPT  If symptoms worsen [address] [place] [address] [address] [facility] 
[phone] Return to ED if worse Diagnosis Clinical Impression: 1. Abdominal pain, right upper quadrant 2. Non-intractable vomiting with nausea, unspecified vomiting type 3. Calculus of gallbladder without cholecystitis without obstruction

## 2018-10-03 NOTE — Clinical Note
Rest, low fat diet. Follow up with your Gastroenterologist as scheduled. Contact information provided for General surgery for follow up of you gallbladder. Return to the Emergency Dept for any worsening pain, fever, nausea/vomiting/diarrhea, decreased  oral intake/urine output.

## 2018-10-05 LAB
BACTERIA SPEC CULT: NORMAL
CC UR VC: NORMAL
SERVICE CMNT-IMP: NORMAL

## 2018-10-09 ENCOUNTER — OFFICE VISIT (OUTPATIENT)
Dept: SURGERY | Age: 53
End: 2018-10-09

## 2018-10-09 VITALS
DIASTOLIC BLOOD PRESSURE: 86 MMHG | HEIGHT: 69 IN | RESPIRATION RATE: 16 BRPM | OXYGEN SATURATION: 98 % | HEART RATE: 80 BPM | TEMPERATURE: 98.5 F | SYSTOLIC BLOOD PRESSURE: 148 MMHG | WEIGHT: 194.8 LBS | BODY MASS INDEX: 28.85 KG/M2

## 2018-10-09 DIAGNOSIS — R11.2 NON-INTRACTABLE VOMITING WITH NAUSEA, UNSPECIFIED VOMITING TYPE: Primary | ICD-10-CM

## 2018-10-09 RX ORDER — ZOLPIDEM TARTRATE 10 MG/1
10 TABLET ORAL
COMMUNITY
Start: 2017-08-22

## 2018-10-09 RX ORDER — OXYCODONE HYDROCHLORIDE 5 MG/1
TABLET ORAL
COMMUNITY
Start: 2017-08-11 | End: 2019-12-11

## 2018-10-09 NOTE — PROGRESS NOTES
Chief Complaint Patient presents with  Abdominal Pain ED [facility] ER ER referral, gilmer zepeda. 1. Have you been to the ER, urgent care clinic since your last visit? [facility]. 2. Have you seen or consulted any other health care providers outside of the [address] [place] [address] since your last visit? Include any pap smears or colon screening.  No

## 2018-10-16 ENCOUNTER — HOSPITAL ENCOUNTER (OUTPATIENT)
Dept: GENERAL RADIOLOGY | Age: 53
Discharge: HOME | End: 2018-10-16
Attending: SURGERY
Payer: COMMERCIAL

## 2018-10-16 DIAGNOSIS — R11.2 NON-INTRACTABLE VOMITING WITH NAUSEA, UNSPECIFIED VOMITING TYPE: ICD-10-CM

## 2018-10-16 PROCEDURE — 74247 XR UPPER GI W KUB AIR CONT: CPT

## 2018-11-27 NOTE — PROGRESS NOTES
To: Beronica Whittington NP From: MD Claudio Rothmankory Torres was referred by the ER at [facility]. Encounter Date: 10/9/2018 History and Physical 
 
Assessment:  
Nausea and vomiting. Sounds like possible gastroparesis, PUD Comorbid gallstones. Sent here by ER for this. Comorbid \"crohns. \"  
Body mass index is 28.77 kg/m². Plan/Recommendations/Medical Decision Making:  
UGI. Possible EGD. HPI:  
Patient is a 48 y.o. female who is seen in consultation at the request of the ER for n/v, abd pain. Was diagnosed with Crohns 20 years ago but is on no meds. Now since march been having abd pain. Sometimes RLQ. Recently epigastric. Now going on 3rd week of continuous symptoms. Has lost 10 lbs. Was seen in ER last week after several days of vomiting. Found on US to have gallstones but inflam changes. Sent here. Past Medical History:  
Diagnosis Date  Crohn's disease ([address]  Hypertension Past Surgical History:  
Procedure Laterality Date  HX ORTHOPAEDIC    
 metal plate in left lower leg  HX ORTHOPAEDIC    
 DDD Social History Tobacco Use  Smoking status: Current Every Day Smoker Packs/day: 1.00  Smokeless tobacco: Never Used Substance Use Topics  Alcohol use: Yes Comment: 3/week History reviewed. No pertinent family history. Current Outpatient Medications Medication Sig  
 oxyCODONE IR (ROXICODONE) 5 mg immediate release tablet TK 2 TS PO Q 4 H PRN  
 zolpidem (AMBIEN) 10 mg tablet Take 10 mg by mouth nightly as needed.  ondansetron (ZOFRAN ODT) 4 mg disintegrating tablet Take 1 Tab by mouth every eight (8) hours as needed for Nausea for up to 15 doses.  promethazine (PHENERGAN) 25 mg tablet Take 1 Tab by mouth every six (6) hours as needed for Nausea for up to 20 doses. (Patient not taking: Reported on 10/9/2018)  multivitamin (ONE A DAY) tablet Take 1 Tab by mouth daily. No current facility-administered medications for this visit. Allergies: 
No Known Allergies Review of Systems:  10 systems reviewed. See scanned sheet in \"Media\" section. See HPI for pertinent positives and negatives. Objective:  
 
Visit Vitals /86 (BP 1 Location: Left arm, BP Patient Position: Sitting) Pulse 80 Temp 98.5 °F (36.9 °C) (Oral) Resp 16 Ht 5' 9\" (1.753 m) Wt 88.4 kg (194 lb 12.8 oz) LMP 10/04/2018 (Exact Date) SpO2 98% BMI 28.77 kg/m² General appearance  Alert, cooperative, no distress, appears stated age HEENT  Anicteric Neck Supple Back   No CVA tenderness Lungs   CTAB Heart  Regular rate and rhythm. No murmur, rub or gallop Abdomen   Soft. Bowel sounds normal.  TTP in the epigastrium only. No English sign. Extremities No CCE. Pulses 2+ right radial  
Skin Skin color, texture, turgor normal.   
   
Neurologic Without overt sensory or motor deficit Imaging and Lab Review:  
images and reports reviewed Signed By: Catina Ramirez MD   
 November 27, 2018

## 2019-11-07 ENCOUNTER — HOSPITAL ENCOUNTER (OUTPATIENT)
Dept: GENERAL RADIOLOGY | Age: 54
Discharge: HOME | End: 2019-11-07
Payer: COMMERCIAL

## 2019-11-07 DIAGNOSIS — J32.9 SINUSITIS: ICD-10-CM

## 2019-11-07 PROCEDURE — 70220 X-RAY EXAM OF SINUSES: CPT

## 2019-12-04 NOTE — PERIOP NOTES
[facility]  Ambulatory Surgery Unit  Pre-operative Instructions    Surgery/Procedure Date  Wednesday, December 11, 2019            Tentative Arrival Time TBD      1. On the day of your surgery/procedure, please report to the Ambulatory Surgery Unit Registration Desk and sign in at your designated time. The Ambulatory Surgery Unit is located in [facility] on the [facility] side of the [facility] across from the [address] [place] [address]. Please have all of your health insurance cards and a photo ID. 2. You must have someone with you to drive you home, as you should not drive a car for 24 hours following anesthesia. Please make arrangements for a responsible adult friend or family member to stay with you for at least the first 24 hours after your surgery. 3. Do not have anything to eat or drink (including water, gum, mints, coffee, juice) after 11:59 PM, Tuesday. This may not apply to medications prescribed by your physician. (Please note below the special instructions with medications to take the morning of surgery, if applicable.)    4. We recommend you do not drink any alcoholic beverages for 24 hours before and after your surgery. 5. Contact your surgeons office for instructions on the following medications: non-steroidal anti-inflammatory drugs (i.e. Advil, Aleve), vitamins, and supplements. (Some surgeons will want you to stop these medications prior to surgery and others may allow you to take them)   **If you are currently taking Plavix, Coumadin, Aspirin and/or other blood-thinning agents, contact your surgeon for instructions. ** Your surgeon will partner with the physician prescribing these medications to determine if it is safe to stop or if you need to continue taking. Please do not stop taking these medications without instructions from your surgeon.     6. In an effort to help prevent surgical site infection, we ask that you shower with an anti-bacterial soap (i.e. Dial/Safeguard, or the soap provided to you at your preadmission testing appointment) for 3 days prior to and on the morning of surgery, using a fresh towel after each shower. (Please begin this process with fresh bed linens.) Do not apply any lotions, powders, or deodorants after the shower on the day of your procedure. If applicable, please do not shave the operative site for 48 hours prior to surgery. 7. Wear comfortable clothes. Wear glasses instead of contacts. Do not bring any jewelry or money (other than copays or fees as instructed). Do not wear make-up, particularly mascara, the morning of your surgery. Do not wear nail polish, particularly if you are having foot /hand surgery. Wear your hair loose or down, no ponytails, buns, jose pins or clips. All body piercings must be removed. 8. You should understand that if you do not follow these instructions your surgery may be cancelled. If your physical condition changes (i.e. fever, cold or flu) please contact your surgeon as soon as possible. 9. It is important that you be on time. If a situation occurs where you may be late, or if you have any questions or problems, please call [phone].    10. Your surgery time may be subject to change. You will receive a phone call the day prior to surgery to confirm your arrival time. 11. Pediatric patients: please bring a change of clothes, diapers, bottle/sippy cup, pacifier, etc.      Special Instructions: Take all medications and inhalers, as prescribed, on the morning of surgery with a sip of water. I understand a pre-operative phone call will be made to verify my surgery time. In the event that I am not available, I give permission for a message to be left on my answering service and/or with another person?       yes    Preop instructions reviewed  Pt verbalized understanding.      ___________________      ___________________      ________________  (Signature of Patient)          (Witness) (Date and Time)

## 2019-12-10 ENCOUNTER — ANESTHESIA EVENT (OUTPATIENT)
Dept: SURGERY | Age: 54
End: 2019-12-10
Payer: COMMERCIAL

## 2019-12-10 NOTE — PERIOP NOTES
Asked patient who would be bringing and staying with her during the procedure and staying with her for 24 hours after the procedure. Patient stated that she has someone dropping her off and someone else picking her up after the procedure but nobody will be staying in the waiting room during the procedure. I informed patient our policy is that someone needs to be in the waiting room the whole time and someone needs to stay with her for 24 hours after the procedure. Patient stated \"I will figure something out\".

## 2019-12-11 ENCOUNTER — HOSPITAL ENCOUNTER (OUTPATIENT)
Age: 54
Setting detail: OUTPATIENT SURGERY
Discharge: HOME | End: 2019-12-11
Attending: OTOLARYNGOLOGY | Admitting: OTOLARYNGOLOGY
Payer: COMMERCIAL

## 2019-12-11 ENCOUNTER — ANESTHESIA (OUTPATIENT)
Dept: SURGERY | Age: 54
End: 2019-12-11
Payer: COMMERCIAL

## 2019-12-11 VITALS
WEIGHT: 205 LBS | SYSTOLIC BLOOD PRESSURE: 117 MMHG | OXYGEN SATURATION: 95 % | BODY MASS INDEX: 30.36 KG/M2 | TEMPERATURE: 98.6 F | DIASTOLIC BLOOD PRESSURE: 73 MMHG | RESPIRATION RATE: 16 BRPM | HEIGHT: 69 IN | HEART RATE: 88 BPM

## 2019-12-11 PROCEDURE — 77030026438 HC STYL ET INTUB CARD -A: Performed by: NURSE ANESTHETIST, CERTIFIED REGISTERED

## 2019-12-11 PROCEDURE — 77030006907 HC BLD SNUS SHV MEDT -C: Performed by: OTOLARYNGOLOGY

## 2019-12-11 PROCEDURE — 74011000272 HC RX REV CODE- 272: Performed by: OTOLARYNGOLOGY

## 2019-12-11 PROCEDURE — 74011000250 HC RX REV CODE- 250: Performed by: OTOLARYNGOLOGY

## 2019-12-11 PROCEDURE — 76210000050 HC AMBSU PH II REC 0.5 TO 1 HR: Performed by: OTOLARYNGOLOGY

## 2019-12-11 PROCEDURE — 74011250636 HC RX REV CODE- 250/636

## 2019-12-11 PROCEDURE — 77030002888 HC SUT CHRMC J&J -A: Performed by: OTOLARYNGOLOGY

## 2019-12-11 PROCEDURE — 74011250637 HC RX REV CODE- 250/637

## 2019-12-11 PROCEDURE — 77030006689 HC BLD OPHTH BVR BD -A: Performed by: OTOLARYNGOLOGY

## 2019-12-11 PROCEDURE — 74011250636 HC RX REV CODE- 250/636: Performed by: NURSE ANESTHETIST, CERTIFIED REGISTERED

## 2019-12-11 PROCEDURE — 77030020255 HC SOL INJ LR 1000ML BG: Performed by: OTOLARYNGOLOGY

## 2019-12-11 PROCEDURE — 77030011645 HC PK NSL DOYLE MEDT -B: Performed by: OTOLARYNGOLOGY

## 2019-12-11 PROCEDURE — 76030000001 HC AMB SURG OR TIME 1 TO 1.5: Performed by: OTOLARYNGOLOGY

## 2019-12-11 PROCEDURE — 77030002916 HC SUT ETHLN J&J -A: Performed by: OTOLARYNGOLOGY

## 2019-12-11 PROCEDURE — 74011000250 HC RX REV CODE- 250: Performed by: NURSE ANESTHETIST, CERTIFIED REGISTERED

## 2019-12-11 PROCEDURE — 77030021668 HC NEB PREFIL KT VYRM -A: Performed by: OTOLARYNGOLOGY

## 2019-12-11 PROCEDURE — 74011250637 HC RX REV CODE- 250/637: Performed by: OTOLARYNGOLOGY

## 2019-12-11 PROCEDURE — 77030002974 HC SUT PLN J&J -A: Performed by: OTOLARYNGOLOGY

## 2019-12-11 PROCEDURE — 76060000062 HC AMB SURG ANES 1 TO 1.5 HR: Performed by: OTOLARYNGOLOGY

## 2019-12-11 PROCEDURE — 74011250636 HC RX REV CODE- 250/636: Performed by: ANESTHESIOLOGY

## 2019-12-11 PROCEDURE — 77030014006 HC SPNG HEMSTAT J&J -A: Performed by: OTOLARYNGOLOGY

## 2019-12-11 PROCEDURE — 76210000034 HC AMBSU PH I REC 0.5 TO 1 HR: Performed by: OTOLARYNGOLOGY

## 2019-12-11 PROCEDURE — 77030008684 HC TU ET CUF COVD -B: Performed by: NURSE ANESTHETIST, CERTIFIED REGISTERED

## 2019-12-11 PROCEDURE — 77030021352 HC CBL LD SYS DISP COVD -B: Performed by: OTOLARYNGOLOGY

## 2019-12-11 PROCEDURE — 74011250636 HC RX REV CODE- 250/636: Performed by: OTOLARYNGOLOGY

## 2019-12-11 PROCEDURE — 77030018836 HC SOL IRR NACL ICUM -A: Performed by: OTOLARYNGOLOGY

## 2019-12-11 RX ORDER — ACETAMINOPHEN 10 MG/ML
1000 INJECTION, SOLUTION INTRAVENOUS ONCE
Status: COMPLETED | OUTPATIENT
Start: 2019-12-11 | End: 2019-12-11

## 2019-12-11 RX ORDER — DEXAMETHASONE SODIUM PHOSPHATE 4 MG/ML
10 INJECTION, SOLUTION INTRA-ARTICULAR; INTRALESIONAL; INTRAMUSCULAR; INTRAVENOUS; SOFT TISSUE ONCE
Status: COMPLETED | OUTPATIENT
Start: 2019-12-11 | End: 2019-12-11

## 2019-12-11 RX ORDER — LIDOCAINE HYDROCHLORIDE AND EPINEPHRINE 10; 10 MG/ML; UG/ML
INJECTION, SOLUTION INFILTRATION; PERINEURAL AS NEEDED
Status: DISCONTINUED | OUTPATIENT
Start: 2019-12-11 | End: 2019-12-11 | Stop reason: HOSPADM

## 2019-12-11 RX ORDER — PHENYLEPHRINE HCL IN 0.9% NACL 0.4MG/10ML
SYRINGE (ML) INTRAVENOUS AS NEEDED
Status: DISCONTINUED | OUTPATIENT
Start: 2019-12-11 | End: 2019-12-11 | Stop reason: HOSPADM

## 2019-12-11 RX ORDER — OXYMETAZOLINE HCL 0.05 %
SPRAY, NON-AEROSOL (ML) NASAL
Status: COMPLETED
Start: 2019-12-11 | End: 2019-12-11

## 2019-12-11 RX ORDER — CEFAZOLIN SODIUM 1 G/3ML
INJECTION, POWDER, FOR SOLUTION INTRAMUSCULAR; INTRAVENOUS
Status: DISCONTINUED
Start: 2019-12-11 | End: 2019-12-11 | Stop reason: HOSPADM

## 2019-12-11 RX ORDER — SUCCINYLCHOLINE CHLORIDE 20 MG/ML
INJECTION INTRAMUSCULAR; INTRAVENOUS AS NEEDED
Status: DISCONTINUED | OUTPATIENT
Start: 2019-12-11 | End: 2019-12-11 | Stop reason: HOSPADM

## 2019-12-11 RX ORDER — LIDOCAINE HYDROCHLORIDE 10 MG/ML
0.1 INJECTION, SOLUTION EPIDURAL; INFILTRATION; INTRACAUDAL; PERINEURAL AS NEEDED
Status: DISCONTINUED | OUTPATIENT
Start: 2019-12-11 | End: 2019-12-11 | Stop reason: HOSPADM

## 2019-12-11 RX ORDER — SODIUM CHLORIDE, SODIUM LACTATE, POTASSIUM CHLORIDE, CALCIUM CHLORIDE 600; 310; 30; 20 MG/100ML; MG/100ML; MG/100ML; MG/100ML
25 INJECTION, SOLUTION INTRAVENOUS CONTINUOUS
Status: DISCONTINUED | OUTPATIENT
Start: 2019-12-11 | End: 2019-12-11 | Stop reason: HOSPADM

## 2019-12-11 RX ORDER — EPHEDRINE SULFATE/0.9% NACL/PF 50 MG/5 ML
SYRINGE (ML) INTRAVENOUS AS NEEDED
Status: DISCONTINUED | OUTPATIENT
Start: 2019-12-11 | End: 2019-12-11 | Stop reason: HOSPADM

## 2019-12-11 RX ORDER — GLYCOPYRROLATE 0.2 MG/ML
INJECTION INTRAMUSCULAR; INTRAVENOUS AS NEEDED
Status: DISCONTINUED | OUTPATIENT
Start: 2019-12-11 | End: 2019-12-11 | Stop reason: HOSPADM

## 2019-12-11 RX ORDER — PROPOFOL 10 MG/ML
INJECTION, EMULSION INTRAVENOUS AS NEEDED
Status: DISCONTINUED | OUTPATIENT
Start: 2019-12-11 | End: 2019-12-11 | Stop reason: HOSPADM

## 2019-12-11 RX ORDER — OXYMETAZOLINE HCL 0.05 %
3 SPRAY, NON-AEROSOL (ML) NASAL ONCE
Status: COMPLETED | OUTPATIENT
Start: 2019-12-11 | End: 2019-12-11

## 2019-12-11 RX ORDER — OMEPRAZOLE 20 MG/1
20 CAPSULE, DELAYED RELEASE ORAL AS NEEDED
COMMUNITY

## 2019-12-11 RX ORDER — PROMETHAZINE HYDROCHLORIDE 12.5 MG/1
12.5 TABLET ORAL
COMMUNITY

## 2019-12-11 RX ORDER — DEXMEDETOMIDINE HYDROCHLORIDE 100 UG/ML
INJECTION, SOLUTION INTRAVENOUS AS NEEDED
Status: DISCONTINUED | OUTPATIENT
Start: 2019-12-11 | End: 2019-12-11 | Stop reason: HOSPADM

## 2019-12-11 RX ORDER — SODIUM CHLORIDE 0.9 % (FLUSH) 0.9 %
5-40 SYRINGE (ML) INJECTION EVERY 8 HOURS
Status: DISCONTINUED | OUTPATIENT
Start: 2019-12-11 | End: 2019-12-11 | Stop reason: HOSPADM

## 2019-12-11 RX ORDER — WATER FOR INJECTION,STERILE
VIAL (ML) INJECTION
Status: DISCONTINUED
Start: 2019-12-11 | End: 2019-12-11 | Stop reason: HOSPADM

## 2019-12-11 RX ORDER — BACITRACIN ZINC 500 UNIT/G
OINTMENT (GRAM) TOPICAL AS NEEDED
Status: DISCONTINUED | OUTPATIENT
Start: 2019-12-11 | End: 2019-12-11 | Stop reason: HOSPADM

## 2019-12-11 RX ORDER — FENTANYL CITRATE 50 UG/ML
25 INJECTION, SOLUTION INTRAMUSCULAR; INTRAVENOUS
Status: COMPLETED | OUTPATIENT
Start: 2019-12-11 | End: 2019-12-11

## 2019-12-11 RX ORDER — LISINOPRIL 20 MG/1
20 TABLET ORAL DAILY
COMMUNITY

## 2019-12-11 RX ORDER — SODIUM CHLORIDE 0.9 % (FLUSH) 0.9 %
5-40 SYRINGE (ML) INJECTION AS NEEDED
Status: DISCONTINUED | OUTPATIENT
Start: 2019-12-11 | End: 2019-12-11 | Stop reason: HOSPADM

## 2019-12-11 RX ORDER — HYDROMORPHONE HYDROCHLORIDE 1 MG/ML
.2-.5 INJECTION, SOLUTION INTRAMUSCULAR; INTRAVENOUS; SUBCUTANEOUS ONCE
Status: DISCONTINUED | OUTPATIENT
Start: 2019-12-11 | End: 2019-12-11 | Stop reason: HOSPADM

## 2019-12-11 RX ORDER — OXYMETAZOLINE HCL 0.05 %
SPRAY, NON-AEROSOL (ML) NASAL AS NEEDED
Status: DISCONTINUED | OUTPATIENT
Start: 2019-12-11 | End: 2019-12-11 | Stop reason: HOSPADM

## 2019-12-11 RX ORDER — MORPHINE SULFATE 10 MG/ML
2 INJECTION, SOLUTION INTRAMUSCULAR; INTRAVENOUS
Status: DISCONTINUED | OUTPATIENT
Start: 2019-12-11 | End: 2019-12-11 | Stop reason: HOSPADM

## 2019-12-11 RX ORDER — FENTANYL CITRATE 50 UG/ML
INJECTION, SOLUTION INTRAMUSCULAR; INTRAVENOUS AS NEEDED
Status: DISCONTINUED | OUTPATIENT
Start: 2019-12-11 | End: 2019-12-11 | Stop reason: HOSPADM

## 2019-12-11 RX ORDER — MIDAZOLAM HYDROCHLORIDE 1 MG/ML
INJECTION, SOLUTION INTRAMUSCULAR; INTRAVENOUS AS NEEDED
Status: DISCONTINUED | OUTPATIENT
Start: 2019-12-11 | End: 2019-12-11 | Stop reason: HOSPADM

## 2019-12-11 RX ORDER — DEXAMETHASONE SODIUM PHOSPHATE 4 MG/ML
INJECTION, SOLUTION INTRA-ARTICULAR; INTRALESIONAL; INTRAMUSCULAR; INTRAVENOUS; SOFT TISSUE
Status: COMPLETED
Start: 2019-12-11 | End: 2019-12-11

## 2019-12-11 RX ORDER — OXYCODONE AND ACETAMINOPHEN 5; 325 MG/1; MG/1
1 TABLET ORAL
Status: DISCONTINUED | OUTPATIENT
Start: 2019-12-11 | End: 2019-12-11 | Stop reason: HOSPADM

## 2019-12-11 RX ORDER — LIDOCAINE HYDROCHLORIDE 20 MG/ML
INJECTION, SOLUTION EPIDURAL; INFILTRATION; INTRACAUDAL; PERINEURAL AS NEEDED
Status: DISCONTINUED | OUTPATIENT
Start: 2019-12-11 | End: 2019-12-11 | Stop reason: HOSPADM

## 2019-12-11 RX ORDER — DIPHENHYDRAMINE HYDROCHLORIDE 50 MG/ML
12.5 INJECTION, SOLUTION INTRAMUSCULAR; INTRAVENOUS AS NEEDED
Status: DISCONTINUED | OUTPATIENT
Start: 2019-12-11 | End: 2019-12-11 | Stop reason: HOSPADM

## 2019-12-11 RX ORDER — ACETAMINOPHEN 10 MG/ML
INJECTION, SOLUTION INTRAVENOUS
Status: COMPLETED
Start: 2019-12-11 | End: 2019-12-11

## 2019-12-11 RX ORDER — ONDANSETRON 2 MG/ML
INJECTION INTRAMUSCULAR; INTRAVENOUS AS NEEDED
Status: DISCONTINUED | OUTPATIENT
Start: 2019-12-11 | End: 2019-12-11 | Stop reason: HOSPADM

## 2019-12-11 RX ADMIN — PROPOFOL 200 MG: 10 INJECTION, EMULSION INTRAVENOUS at 10:47

## 2019-12-11 RX ADMIN — OXYMETAZOLINE HCL 3 SPRAY: 0.05 SPRAY NASAL at 09:45

## 2019-12-11 RX ADMIN — DEXAMETHASONE SODIUM PHOSPHATE 10 MG: 4 INJECTION, SOLUTION INTRA-ARTICULAR; INTRALESIONAL; INTRAMUSCULAR; INTRAVENOUS; SOFT TISSUE at 09:48

## 2019-12-11 RX ADMIN — Medication 80 MCG: at 11:05

## 2019-12-11 RX ADMIN — FENTANYL CITRATE 25 MCG: 50 INJECTION, SOLUTION INTRAMUSCULAR; INTRAVENOUS at 12:07

## 2019-12-11 RX ADMIN — DEXMEDETOMIDINE HYDROCHLORIDE 4 MCG: 100 INJECTION, SOLUTION, CONCENTRATE INTRAVENOUS at 11:21

## 2019-12-11 RX ADMIN — ACETAMINOPHEN 1000 MG: 10 INJECTION, SOLUTION INTRAVENOUS at 12:31

## 2019-12-11 RX ADMIN — Medication 120 MCG: at 11:14

## 2019-12-11 RX ADMIN — MEPERIDINE HYDROCHLORIDE 12.5 MG: 25 INJECTION INTRAMUSCULAR; INTRAVENOUS; SUBCUTANEOUS at 12:05

## 2019-12-11 RX ADMIN — GLYCOPYRROLATE 0.1 MG: 0.2 INJECTION, SOLUTION INTRAMUSCULAR; INTRAVENOUS at 11:06

## 2019-12-11 RX ADMIN — DEXAMETHASONE SODIUM PHOSPHATE 10 MG: 4 INJECTION, SOLUTION INTRAMUSCULAR; INTRAVENOUS at 09:48

## 2019-12-11 RX ADMIN — PROPOFOL 20 MG: 10 INJECTION, EMULSION INTRAVENOUS at 11:15

## 2019-12-11 RX ADMIN — ONDANSETRON HYDROCHLORIDE 4 MG: 2 INJECTION, SOLUTION INTRAMUSCULAR; INTRAVENOUS at 11:08

## 2019-12-11 RX ADMIN — SODIUM CHLORIDE, SODIUM LACTATE, POTASSIUM CHLORIDE, AND CALCIUM CHLORIDE 25 ML/HR: 600; 310; 30; 20 INJECTION, SOLUTION INTRAVENOUS at 12:00

## 2019-12-11 RX ADMIN — MIDAZOLAM HYDROCHLORIDE 2 MG: 1 INJECTION, SOLUTION INTRAMUSCULAR; INTRAVENOUS at 10:36

## 2019-12-11 RX ADMIN — FENTANYL CITRATE 100 MCG: 50 INJECTION, SOLUTION INTRAMUSCULAR; INTRAVENOUS at 10:43

## 2019-12-11 RX ADMIN — FENTANYL CITRATE 25 MCG: 50 INJECTION, SOLUTION INTRAMUSCULAR; INTRAVENOUS at 12:24

## 2019-12-11 RX ADMIN — Medication 80 MCG: at 11:07

## 2019-12-11 RX ADMIN — Medication 10 MG: at 11:13

## 2019-12-11 RX ADMIN — PROPOFOL 40 MG: 10 INJECTION, EMULSION INTRAVENOUS at 11:19

## 2019-12-11 RX ADMIN — Medication 120 MCG: at 11:10

## 2019-12-11 RX ADMIN — LIDOCAINE HYDROCHLORIDE 100 MG: 20 INJECTION, SOLUTION INTRAVENOUS at 10:43

## 2019-12-11 RX ADMIN — Medication 80 MCG: at 11:06

## 2019-12-11 RX ADMIN — Medication 160 MCG: at 11:03

## 2019-12-11 RX ADMIN — SUCCINYLCHOLINE CHLORIDE 140 MG: 20 INJECTION, SOLUTION INTRAMUSCULAR; INTRAVENOUS at 10:47

## 2019-12-11 RX ADMIN — WATER 2 G: 1 INJECTION INTRAMUSCULAR; INTRAVENOUS; SUBCUTANEOUS at 10:52

## 2019-12-11 RX ADMIN — MEPERIDINE HYDROCHLORIDE 12.5 MG: 25 INJECTION INTRAMUSCULAR; INTRAVENOUS; SUBCUTANEOUS at 12:12

## 2019-12-11 RX ADMIN — SUCCINYLCHOLINE CHLORIDE 60 MG: 20 INJECTION, SOLUTION INTRAMUSCULAR; INTRAVENOUS at 10:50

## 2019-12-11 RX ADMIN — PROPOFOL 30 MG: 10 INJECTION, EMULSION INTRAVENOUS at 10:52

## 2019-12-11 RX ADMIN — DEXMEDETOMIDINE HYDROCHLORIDE 6 MCG: 100 INJECTION, SOLUTION, CONCENTRATE INTRAVENOUS at 10:52

## 2019-12-11 RX ADMIN — GLYCOPYRROLATE 0.1 MG: 0.2 INJECTION, SOLUTION INTRAMUSCULAR; INTRAVENOUS at 11:03

## 2019-12-11 RX ADMIN — Medication 3 SPRAY: at 09:45

## 2019-12-11 RX ADMIN — SODIUM CHLORIDE, SODIUM LACTATE, POTASSIUM CHLORIDE, AND CALCIUM CHLORIDE 25 ML/HR: 600; 310; 30; 20 INJECTION, SOLUTION INTRAVENOUS at 09:45

## 2019-12-11 RX ADMIN — Medication 80 MCG: at 11:26

## 2019-12-11 RX ADMIN — PROPOFOL 50 MG: 10 INJECTION, EMULSION INTRAVENOUS at 10:50

## 2019-12-11 RX ADMIN — FENTANYL CITRATE 25 MCG: 50 INJECTION, SOLUTION INTRAMUSCULAR; INTRAVENOUS at 12:17

## 2019-12-11 RX ADMIN — FENTANYL CITRATE 25 MCG: 50 INJECTION, SOLUTION INTRAMUSCULAR; INTRAVENOUS at 12:21

## 2019-12-11 NOTE — OP NOTES
[address]  OPERATIVE REPORT    Name:  Micheal Hardy  MR#:  [id number]  :  1965  ACCOUNT #:  [de-identified]  DATE OF SERVICE:  2019    PREOPERATIVE DIAGNOSES:  Nasal septal deviation, turbinate hypertrophy. POSTOPERATIVE DIAGNOSES:  Nasal septal deviation, turbinate hypertrophy. PROCEDURE PERFORMED:  Septoplasty, bilateral inferior turbinate submucosal resection with therapeutic outfracture. SURGEON:  Estefanía Bahena MD    ASSISTANT:  None. ANESTHESIA:  General endotracheal tube anesthesia. COMPLICATIONS:  None apparent. SPECIMENS REMOVED:  None. ESTIMATED BLOOD LOSS:  25 mL. IMPLANTS:  Pascal splints. INDICATIONS:  The patient is a 53-year-old female with a long history of chronic nasal airway obstruction, which has been refractory to medical therapy. Preoperatively, the alternatives, potential benefits, and possible risks of the procedure were explained to the patient, who understood and requested to proceed. PROCEDURE:  The patient received 0.25% oxymetazoline in the preanesthesia holding area. The patient was brought to the operating room, placed supine on the operating table and following the smooth induction of general endotracheal tube anesthesia, the table was turned to 90 degrees and the patient was positioned for operation. A 1% lidocaine with epinephrine was injected in a submucoperichondrial and submucoperiosteal fashion from anterior to posterior. Further oxymetazoline on neurosurgical cottonoids was placed intranasally. A routine Betadine prep and drape was carried out. A 6400 [place] blade was used to initiate a left hemitransfixion incision. A submucoperichondrial and submucoperiosteal elevation of membranes was carried out from anterior to posterior. The osteocartilaginous junction was identified and  with a Norton calibrated elevator.   The deformed and obstructing portions of posterior osseous septum were removed with a [facility] punch. A spur arising from the maxillary crest was removed with a 4-mm osteotome. A 2 mm strip of anterior-superior quadrangular cartilage was removed to allow a swinging door return to midline of the planar portions of quadrangular cartilage. Satisfactory reduction of the nasal septal constituents was observed. Attention was turned to turbinate reduction. Using the Straightshot handpiece with a 2-mm turbinate blade attached, a submucosal resection of the right and left inferior turbinate soft tissues was carried out. After adequate soft tissue had been resected, a Boies elevator was used to perform a therapeutic outfracture of the inferior turbinate on each side. A satisfactory nasal airway was subsequently observed and attention was turned to closure. Interrupted 4-0 chromic was used to approximate the margins of the left hemitransfixion incision. A running 4-0 plain gut suture was used to approximate septal membranes to the midline. Pascal ventilated splints were placed and secured with a nylon mattress suture. Bacitracin-coated Gelfoam was placed on each side of the nasal cavity to further support the inferior turbinates in a lateralized position. With application of a sterile nasal dressing, the patient's procedure was concluded. The patient was aroused from general anesthesia, extubated in the operating room, and transferred to the recovery area in satisfactory condition.       Maggie Brown MD      [id number]  D:  12/11/2019 11:49  T:  12/11/2019 18:24  JOB #:  [id number]  CC:  MD Manuela Humphreys MD

## 2019-12-11 NOTE — PERIOP NOTES
Yusuf Trent  1965  [id number]    Situation:  Verbal report given from: WESLEY Gavin RN  Procedure: Procedure(s):  SEPTOPLASTY/TURBINATE REDUCTION    Background:    Preoperative diagnosis: DEVIATED SEPTUM/TURBINATE HYTEROPHY    Postoperative diagnosis: DEVIATED SEPTUM/TURBINATE HYTEROPHY    :  Dr. Dario Leone    Assistant(s): Circ-1: Tawanna Kumari  Scrub Tech-1: Brian SANDY    Specimens: * No specimens in log *    Assessment:  Intra-procedure medications         Anesthesia gave intra-procedure sedation and medications, see anesthesia flow sheet     Intravenous fluids: LR@ KVO     Vital signs stable       Recommendation:

## 2019-12-11 NOTE — DISCHARGE INSTRUCTIONS
>>>You received an IV form of Tylenol 1000mg (Ofirmev) during your surgery, you may take tylenol (or pain medication containing Tylenol or Acetaminophen) in 4 hours at 4:30 pm.<<<        [facility], DAVID Dejesus. Jerfy Bates M.D., Ph.D., F.A.C.S. [facility], [address] [facility]  [phone]    POST OPERATIVE INSTRUCTIONS-SEPTOPLASTY    The following instructions are designed to help you recover from surgery as easily as possible. Taking care of yourself can prevent complications. It is very important that you read this sheet often and follow the instructions carefully while you are at home. Your doctor or nurse will be happy to answer any questions. 1. DIET:  You may resume your normal diet in 24 hours. We suggest nothing heavy or greasy and avoid dairy products the first 24 hours. It is important to remember that good overall diet and health promotes healing. 2.  ACTIVITY RESTRICTIONS:  The following guidelines should be followed until your doctor tells you otherwise:   Do not blow your nose, sniff and if you have to sneeze or cough-do with mouth open   Do not lift anything over five pounds.  Do not bend over. Avoid doing things like tying your shoes or picking up things off the floor.  Do not do heavy exercise or play contact sports   Do not strain for a bowel movement. If you are constipated, take a stool softener or a gentle laxative.  Go back to work or school in one week. 3.  HOW TO TAKE CARE OF YOUR NOSE AND SINUSES:  Take the antibiotic prescribed by your doctor. Call if you have any problems or questions. We expect bloody drainage from your nose. You are to change the dressing below your nose as needed and expect to do this 10 to 12 times the first day. We want the drainage to come forward and not down your throat. We suggest you rest and sleep elevated on several pillows on your side. You will have less drainage each day and the swelling will reduce in several days. Call the office if you have bloody saturated gauze more than once an hour. Clean the nasal openings twice daily with a Q-tip soaked in hydrogen peroxide until clear of all crusts. Take the pain medication prescribed by your doctor as needed for discomfort. No Aspirin, Motrin, Alleve, Advil or similar products for 3 weeks. You make take Tylenol (Acetaminophen) when you no longer need prescribed medication. Do not take Tylenol on top of pain medication because Tylenol is in the pain medication. Avoid smoke, dust, fumes or anything else that might irritate the nose. Protect yourself from any unexpected injury to your nose or face, such as being hit by small children or a restless bedmate. You may find that a cool mist room humidifier is helpful in decreasing the amount of dryness in your nose and mouth. After surgery you should use a nasal saline spray such as Ayr or Simply Saline. Use 4 sprays in each nostril every two hours while awake to help prevent crusts from forming in your nose. 4. RETURN APPOINTMENT:   You need to make a follow-up appointment with your doctor in 5 days. At this time your nose will be gently and carefully examined and your packing will be removed. 5. NOTIFY YOUR DOCTOR IF YOU HAVE:    A sudden increase in the amount of bleeding from the nose   A fever above 101 degrees F, which persists despite increasing the amount of fluids you take. A person with a fever should try to drink one cup of water each waking hour.  Persistent sharp pain that is not relieved by the pain medication you receive.  Increased swelling or redness of your nose. If you have any questions or concerns following your surgery do not hesitate to contact our office at [phone]     DO  [facility], [address] [facility], [address] [facility].     TAKE NARCOTIC PAIN MEDICATIONS WITH FOOD Narcotics tend to be constipating, we suggest taking a stool softener such as Colace or Miralax (follow package instructions). DO NOT DRIVE WHILE TAKING NARCOTIC PAIN MEDICATIONS. DO NOT TAKE SLEEPING MEDICATIONS OR ANTIANXIETY MEDICATIONS WHILE TAKING NARCOTIC PAIN MEDICATIONS,  ESPECIALLY THE NIGHT OF ANESTHESIA! CPAP PATIENTS BE SURE TO WEAR MACHINE WHENEVER NAPPING OR SLEEPING! DISCHARGE SUMMARY from Nurse    The following personal items collected during your admission are returned to you:   Dental Appliance: Dental Appliances: None  Vision: Visual Aid: Glasses, At home  Hearing Aid:    Jewelry: Jewelry: None  Clothing: Clothing: With patient, Shirt, Undergarments, Footwear, Pants, Socks, At bedside  Other Valuables: Other Valuables: Purse(given to )  Valuables sent to safe:        PATIENT INSTRUCTIONS:    After General Anesthesia or Intravenous Sedation, for 24 hours or while taking prescription Narcotics:        Someone should be with you for the next 24 hours. For your own safety, a responsible adult must drive you home. · Limit your activities  · Recommended activity: Rest today, up with assistance today. Do not climb stairs or shower unattended for the next 24 hours. · Please start with a soft bland diet and advance as tolerated (no nausea) to regular diet. · If you have a sore throat you should try the following: fluids, warm salt water gargles, or throat lozenges. If it does not improve after several days please follow up with your primary physician. · Do not drive and operate hazardous machinery  · Do not make important personal or business decisions  · Do  not drink alcoholic beverages  · If you have not urinated within 8 hours after discharge, please contact your surgeon on call.     Report the following to your surgeon:  · Excessive pain, swelling, redness or odor of or around the surgical area  · Temperature over 100.5  · Nausea and vomiting lasting longer than 4 hours or if unable to take medications  · Any signs of decreased circulation or nerve impairment to extremity: change in color, persistent  numbness, tingling, coldness or increase pain      · You will receive a Post Operative Call from one of the Recovery Room Nurses on the day after your surgery to check on you. It is very important for us to know how you are recovering after your surgery. If you have an issue or need to speak with someone, please call your surgeon, do not wait for the post operative call. · You may receive an e-mail or letter in the mail from [employer] regarding your experience with us in the Ambulatory Surgery Unit. Your feedback is valuable to us and we appreciate your participation in the survey. · If the above instructions are not adequate or you are having problems after your surgery, call the physician at their office number. · We wish you a speedy recovery ? What to do at Home:      *  Please give a list of your current medications to your Primary Care Provider. *  Please update this list whenever your medications are discontinued, doses are      changed, or new medications (including over-the-counter products) are added. *  Please carry medication information at all times in case of emergency situations. If you have not received your influenza and/or pneumococcal vaccine, please follow up with your primary care physician. The discharge information has been reviewed with the patient and caregiver. The patient and caregiver verbalized understanding.

## 2019-12-11 NOTE — BRIEF OP NOTE
BRIEF OPERATIVE NOTE    Date of Procedure: 12/11/2019   Preoperative Diagnosis: DEVIATED SEPTUM/TURBINATE HYTEROPHY  Postoperative Diagnosis: DEVIATED SEPTUM/TURBINATE HYTEROPHY    Procedure(s):  SEPTOPLASTY/TURBINATE REDUCTION  Surgeon(s) and Role:     * Payal Yates MD - Primary         Surgical Assistant: None    Surgical Staff:  Circ-1: Toyin Edmondson  Scrub Tech-1: Chayo Mulligan  Event Time In Time Out   Incision Start 1102    Incision Close 1128      Anesthesia: General   Estimated Blood Loss: 25 ml  Specimens: * No specimens in log *   Findings: As expected. Complications: None apparent.   Implants: * No implants in log *

## 2019-12-11 NOTE — ANESTHESIA POSTPROCEDURE EVALUATION
Procedure(s):  SEPTOPLASTY/TURBINATE REDUCTION. general    Anesthesia Post Evaluation      Multimodal analgesia: multimodal analgesia used between 6 hours prior to anesthesia start to PACU discharge  Patient location during evaluation: PACU  Patient participation: complete - patient participated  Level of consciousness: awake and alert  Pain score: 5  Airway patency: patent  Anesthetic complications: no  Cardiovascular status: acceptable  Respiratory status: acceptable  Hydration status: acceptable  Comments: Required Glidescope for intubation. Grade IV view with standard DVL.   Post anesthesia nausea and vomiting:  none      Vitals Value Taken Time   /70 12/11/2019 12:23 PM   Temp 37 °C (98.6 °F) 12/11/2019 12:08 PM   Pulse 101 12/11/2019 12:23 PM   Resp 24 12/11/2019 12:23 PM   SpO2 95 % 12/11/2019 12:23 PM

## 2019-12-11 NOTE — PERIOP NOTES
Permission received to review discharge instructions and discuss private health information with arianna

## 2019-12-11 NOTE — ANESTHESIA PREPROCEDURE EVALUATION
Relevant Problems   No relevant active problems       Anesthetic History   No history of anesthetic complications            Review of Systems / Medical History  Patient summary reviewed, nursing notes reviewed and pertinent labs reviewed    Pulmonary          Smoker (Marijuana occasionally & 1 ppd cigs)      Comments: Deviated septum  Turbinate hypertrophy   Neuro/Psych              Cardiovascular    Hypertension: well controlled              Exercise tolerance: >4 METS     GI/Hepatic/Renal  Within defined limits              Endo/Other        Arthritis (DDD, lumbar)     Other Findings            Physical Exam    Airway  Mallampati: III  TM Distance: 4 - 6 cm  Neck ROM: normal range of motion   Mouth opening: Normal     Cardiovascular    Rhythm: regular  Rate: normal         Dental  No notable dental hx       Pulmonary  Breath sounds clear to auscultation               Abdominal  GI exam deferred       Other Findings            Anesthetic Plan    ASA: 2  Anesthesia type: general    Monitoring Plan: BIS      Induction: Intravenous  Anesthetic plan and risks discussed with: Patient

## 2021-02-25 NOTE — PERIOP NOTES
Received care of patient. Provided dark quiet environment to help headache and IV Ofirmev infusing. Reviewed instructions with . 1300 Pt and  state understanding and state ready to go home. Denies chill and headache easing. Both understand that if use Oxycodone then can not use Norco and visa/versa. . Going by RX to  RX and supplies.  [phone] [id number] Discharged to car without incident and supplies Apixaban/Eliquis increases your risk for bleeding. Notify your doctor if you experience any of the following side effects: bleeding, coughing or vomiting blood, red or black stool, unexpected pain or swelling, itching or hives, chest pain, chest tightness, trouble breathing, changes in how much or how often you urinate, red or pink urine, numbness or tingling in your feet, or unusual muscle weakness. When Apixaban/Eliquis is taken with other medicines, they can affect how it works. Taking other medications such as aspirin, blood thinners, nonsteroidal anti-inflammatories, and medications that treat depression can increase your risk of bleeding. It is very important to tell your health care provider about all of the other medicines, including over-the-counter medications, herbs, and vitamins you are taking. DO NOT start, stop, or change the dosage of any medicine, including over-the-counter medicines, vitamins, and herbal products without your doctor’s approval. Any products containing aspirin or are nonsteroidal anti-inflammatories lessen the blood’s ability to form clots and add to the effect of Apixaban/Eliquis. Never take aspirin or medicines that contain aspirin without speaking to your doctor.

## 2023-05-02 ENCOUNTER — TRANSCRIBE ORDER (OUTPATIENT)
Dept: REGISTRATION | Age: 58
End: 2023-05-02

## 2023-05-02 ENCOUNTER — HOSPITAL ENCOUNTER (OUTPATIENT)
Dept: GENERAL RADIOLOGY | Age: 58
Discharge: HOME | End: 2023-05-02
Payer: COMMERCIAL

## 2023-05-02 DIAGNOSIS — M54.50 LOW BACK PAIN, UNSPECIFIED: Primary | ICD-10-CM

## 2023-05-02 DIAGNOSIS — M54.6 PAIN IN THORACIC SPINE: ICD-10-CM

## 2023-05-02 DIAGNOSIS — M54.50 LOW BACK PAIN, UNSPECIFIED: ICD-10-CM

## 2023-05-02 PROCEDURE — 72110 X-RAY EXAM L-2 SPINE 4/>VWS: CPT

## 2023-05-02 PROCEDURE — 72100 X-RAY EXAM L-S SPINE 2/3 VWS: CPT

## 2023-05-02 PROCEDURE — 72074 X-RAY EXAM THORAC SPINE4/>VW: CPT

## 2023-05-02 PROCEDURE — 72072 X-RAY EXAM THORAC SPINE 3VWS: CPT

## 2024-03-28 ENCOUNTER — HOSPITAL ENCOUNTER (OUTPATIENT)
Facility: HOSPITAL | Age: 59
Discharge: HOME | End: 2024-03-28
Payer: COMMERCIAL

## 2024-03-28 DIAGNOSIS — M54.17 RADICULOPATHY, LUMBOSACRAL REGION: ICD-10-CM

## 2024-03-28 PROCEDURE — 72148 MRI LUMBAR SPINE W/O DYE: CPT

## 2024-04-09 ENCOUNTER — OFFICE VISIT (OUTPATIENT)
Facility: CLINIC | Age: 59
End: 2024-04-09
Payer: COMMERCIAL

## 2024-04-09 VITALS
RESPIRATION RATE: 16 BRPM | WEIGHT: 206 LBS | DIASTOLIC BLOOD PRESSURE: 88 MMHG | HEART RATE: 83 BPM | SYSTOLIC BLOOD PRESSURE: 142 MMHG | HEIGHT: 69 IN | OXYGEN SATURATION: 96 % | TEMPERATURE: 97.4 F | BODY MASS INDEX: 30.51 KG/M2

## 2024-04-09 DIAGNOSIS — K76.9 HEPATIC LESION: ICD-10-CM

## 2024-04-09 DIAGNOSIS — E11.9 TYPE 2 DIABETES MELLITUS WITHOUT COMPLICATION, WITHOUT LONG-TERM CURRENT USE OF INSULIN (HCC): ICD-10-CM

## 2024-04-09 DIAGNOSIS — R73.09 BLOOD GLUCOSE ABNORMAL: ICD-10-CM

## 2024-04-09 DIAGNOSIS — E78.2 MIXED HYPERLIPIDEMIA: ICD-10-CM

## 2024-04-09 DIAGNOSIS — Z87.891 PERSONAL HISTORY OF TOBACCO USE: ICD-10-CM

## 2024-04-09 DIAGNOSIS — I10 ESSENTIAL HYPERTENSION, BENIGN: ICD-10-CM

## 2024-04-09 DIAGNOSIS — N20.0 CALCULUS OF KIDNEY: ICD-10-CM

## 2024-04-09 DIAGNOSIS — R14.0 BLOATING: Primary | ICD-10-CM

## 2024-04-09 DIAGNOSIS — Z11.59 ENCOUNTER FOR HEPATITIS C SCREENING TEST FOR LOW RISK PATIENT: ICD-10-CM

## 2024-04-09 DIAGNOSIS — R10.84 GENERALIZED ABDOMINAL PAIN: ICD-10-CM

## 2024-04-09 DIAGNOSIS — K76.0 HEPATIC STEATOSIS: ICD-10-CM

## 2024-04-09 PROCEDURE — 3079F DIAST BP 80-89 MM HG: CPT | Performed by: NURSE PRACTITIONER

## 2024-04-09 PROCEDURE — 3077F SYST BP >= 140 MM HG: CPT | Performed by: NURSE PRACTITIONER

## 2024-04-09 PROCEDURE — G0296 VISIT TO DETERM LDCT ELIG: HCPCS | Performed by: NURSE PRACTITIONER

## 2024-04-09 RX ORDER — SIMETHICONE 80 MG
80 TABLET,CHEWABLE ORAL EVERY 6 HOURS PRN
COMMUNITY

## 2024-04-09 RX ORDER — ALPHA-D-GALACTOSIDASE 300 UNIT
CAPSULE ORAL
COMMUNITY

## 2024-04-09 RX ORDER — LISINOPRIL AND HYDROCHLOROTHIAZIDE 20; 12.5 MG/1; MG/1
1 TABLET ORAL DAILY
Qty: 90 TABLET | Refills: 3 | Status: SHIPPED | OUTPATIENT
Start: 2024-04-09

## 2024-04-09 RX ORDER — TIZANIDINE 2 MG/1
TABLET ORAL
COMMUNITY
Start: 2021-05-24

## 2024-04-09 RX ORDER — MULTIVIT-MIN/IRON/FOLIC ACID/K 18-600-40
CAPSULE ORAL
COMMUNITY

## 2024-04-09 RX ORDER — ZOLPIDEM TARTRATE 5 MG/1
TABLET ORAL
COMMUNITY
End: 2024-04-09 | Stop reason: ALTCHOICE

## 2024-04-09 RX ORDER — M-VIT,TX,IRON,MINS/CALC/FOLIC 27MG-0.4MG
1 TABLET ORAL DAILY
COMMUNITY

## 2024-04-09 RX ORDER — CETIRIZINE HYDROCHLORIDE 5 MG/1
5 TABLET ORAL DAILY
COMMUNITY

## 2024-04-09 RX ORDER — LISINOPRIL AND HYDROCHLOROTHIAZIDE 20; 12.5 MG/1; MG/1
1 TABLET ORAL DAILY
COMMUNITY
Start: 2021-04-04 | End: 2024-04-09 | Stop reason: SDUPTHER

## 2024-04-09 RX ORDER — VALACYCLOVIR HYDROCHLORIDE 500 MG/1
TABLET, FILM COATED ORAL
COMMUNITY

## 2024-04-09 RX ORDER — OXYCODONE AND ACETAMINOPHEN 10; 325 MG/1; MG/1
TABLET ORAL
COMMUNITY
Start: 2024-04-06

## 2024-04-09 ASSESSMENT — PATIENT HEALTH QUESTIONNAIRE - PHQ9
SUM OF ALL RESPONSES TO PHQ QUESTIONS 1-9: 0
1. LITTLE INTEREST OR PLEASURE IN DOING THINGS: NOT AT ALL
SUM OF ALL RESPONSES TO PHQ QUESTIONS 1-9: 0
2. FEELING DOWN, DEPRESSED OR HOPELESS: NOT AT ALL
SUM OF ALL RESPONSES TO PHQ QUESTIONS 1-9: 0
SUM OF ALL RESPONSES TO PHQ9 QUESTIONS 1 & 2: 0
SUM OF ALL RESPONSES TO PHQ QUESTIONS 1-9: 0

## 2024-04-09 NOTE — PROGRESS NOTES
Lily Card is a 58 y.o. female who presents for  Chief Complaint   Patient presents with    New Patient     GI issues/ bloating         HPI    Former patient of Dr Shar Yang ([facility])  She follows with Dr Macie Siknner for opioid management.  She also follows with Dr Peterson for routine GYN care.    Today she reports bloating and abdominal pain x 1 week  Tried beano, gas x with no improvement  As above she is on chronic opioid pain mgmt., but denies constipation (last BM was this morning - normal), denies heartburn (takes daily PPI)  Last colonoscopy was about 4 years ago and was normal ([facility]) RPT 10 year  Had cholecystectomy several years ago (2018)    Last CT of abdomen in 2018  showed cholelithiasis (underwent cholecystectomy as above), hepatic steatosis, hepatic cysts and hemangiomas, non obstructing nephrolithiasis.    Physical exam today reveals TTP RUQ, RLQ, LLQ.  No TTP adnexal or suprapubic areas.  No CVAT.    Will get labs, CT abdomen.      Health Maintenance  She declines vaccines, but is interested in LDCT for lung cancer screen.  Will review remaining  next visit            Vitals:    04/09/24 1452 04/09/24 1454   BP: (!) 149/85 (!) 142/88   Pulse: 83    Resp: 16    Temp: 97.4 °F (36.3 °C)    SpO2: 96%    Weight: 93.4 kg (206 lb)    Height: 1.753 m (5' 9\")       Wt Readings from Last 3 Encounters:   04/09/24 93.4 kg (206 lb)      Body mass index is 30.42 kg/m².     Review of Systems   Constitutional: Negative for headache, fever, and unexpected weight change.  Eyes: Negative for visual changes.  Respiratory: Negative for shortness of breath, wheezing.  Cardiovascular: Negative for chest pain, palpitations, shortness of breath, and leg swelling.  GI/: per HPI  Musculoskeletal: back pain  Skin: No rashes, skin changes  Neurological: Negative for dizziness, weakness, light-headedness.  Hematological: Negative for adenopathy. Does not bruise/bleed easily.   Psychiatric: Negative for anxiety,

## 2024-04-09 NOTE — PATIENT INSTRUCTIONS
.[email or web address]           Learning About Lung Cancer Screening  What is screening for lung cancer?     Lung cancer screening is a way to find some lung cancers early, before a person has any symptoms of the cancer.  Lung cancer screening may help those who have the highest risk for lung cancer--people age 50 and older who are or were heavy smokers. For most people, who aren't at increased risk, screening for lung cancer probably isn't helpful.  Screening won't prevent cancer. And it may not find all lung cancers. Lung cancer screening may lower the risk of dying from lung cancer in a small number of people.  How is it done?  Lung cancer screening is done with a low-dose CT (computed tomography) scan. A CT scan uses X-rays, or radiation, to make detailed pictures of your body. Experts recommend that screening be done in medical centers that focus on finding and treating lung cancer.  Who is screening recommended for?  Lung cancer screening is recommended for people age 50 and older who are or were heavy smokers. That means people with a smoking history of at least 20 pack years. A pack year is a way to measure how heavy a smoker you are or were.  To figure out your pack years, multiply how many packs a day on average (assuming 20 cigarettes per pack) you have smoked by how many years you have smoked. For example:  If you smoked 1 pack a day for 20 years, that's 1 times 20. So you have a smoking history of 20 pack years.  If you smoked 2 packs a day for 10 years, that's 2 times 10. So you have a smoking history of 20 pack years.  Experts agree that screening is for people who have a high risk of lung cancer. But experts don't agree on what high risk means. Some say people age 50 or older with at least a 20-pack-year smoking history are high risk. Others say it's people age 55 or older with a 30-pack-year history.  To see if you could benefit from screening, first find out if you are at high risk for lung cancer. Your doctor

## 2024-04-09 NOTE — PROGRESS NOTES
Lily Card is a 58 y.o. female    Chief Complaint   Patient presents with    New Patient     GI issues/ bloating       BP (!) 142/88   Pulse 83   Temp 97.4 °F (36.3 °C)   Resp 16   Ht 1.753 m (5' 9\")   Wt 93.4 kg (206 lb)   SpO2 96%   BMI 30.42 kg/m²         1. Have you been to the ER, urgent care clinic since your last visit?  Hospitalized since your last visit? No    2. Have you seen or consulted any other health care providers outside of the [facility] since your last visit?  Include any pap smears or colon screening. No    Learning Assessment:       No data to display                Fall Risk Assessment:       No data to display                Abuse Screening:       No data to display                ADL Screening:       No data to display

## 2024-04-10 LAB
ALBUMIN SERPL-MCNC: 4.9 G/DL (ref 3.8–4.9)
ALBUMIN/GLOB SERPL: 1.8 {RATIO} (ref 1.2–2.2)
ALP SERPL-CCNC: 101 IU/L (ref 44–121)
ALT SERPL-CCNC: 47 IU/L (ref 0–32)
AST SERPL-CCNC: 27 IU/L (ref 0–40)
BASOPHILS # BLD AUTO: 0.1 X10E3/UL (ref 0–0.2)
BASOPHILS NFR BLD AUTO: 1 %
BILIRUB SERPL-MCNC: 0.9 MG/DL (ref 0–1.2)
BUN SERPL-MCNC: 21 MG/DL (ref 6–24)
BUN/CREAT SERPL: 22 (ref 9–23)
CALCIUM SERPL-MCNC: 10.4 MG/DL (ref 8.7–10.2)
CHLORIDE SERPL-SCNC: 98 MMOL/L (ref 96–106)
CHOLEST SERPL-MCNC: 281 MG/DL (ref 100–199)
CO2 SERPL-SCNC: 23 MMOL/L (ref 20–29)
CREAT SERPL-MCNC: 0.95 MG/DL (ref 0.57–1)
EGFRCR SERPLBLD CKD-EPI 2021: 70 ML/MIN/1.73
EOSINOPHIL # BLD AUTO: 0.1 X10E3/UL (ref 0–0.4)
EOSINOPHIL NFR BLD AUTO: 1 %
ERYTHROCYTE [DISTWIDTH] IN BLOOD BY AUTOMATED COUNT: 12.3 % (ref 11.7–15.4)
GLOBULIN SER CALC-MCNC: 2.7 G/DL (ref 1.5–4.5)
GLUCOSE SERPL-MCNC: 126 MG/DL (ref 70–99)
HBA1C MFR BLD: 7.3 % (ref 4.8–5.6)
HCT VFR BLD AUTO: 45.4 % (ref 34–46.6)
HCV IGG SERPL QL IA: NON REACTIVE
HDLC SERPL-MCNC: 45 MG/DL
HGB BLD-MCNC: 15.1 G/DL (ref 11.1–15.9)
IMM GRANULOCYTES # BLD AUTO: 0 X10E3/UL (ref 0–0.1)
IMM GRANULOCYTES NFR BLD AUTO: 0 %
LDLC SERPL CALC-MCNC: 181 MG/DL (ref 0–99)
LIPASE SERPL-CCNC: 38 U/L (ref 14–72)
LYMPHOCYTES # BLD AUTO: 2.2 X10E3/UL (ref 0.7–3.1)
LYMPHOCYTES NFR BLD AUTO: 29 %
MCH RBC QN AUTO: 31.4 PG (ref 26.6–33)
MCHC RBC AUTO-ENTMCNC: 33.3 G/DL (ref 31.5–35.7)
MCV RBC AUTO: 94 FL (ref 79–97)
MONOCYTES # BLD AUTO: 0.4 X10E3/UL (ref 0.1–0.9)
MONOCYTES NFR BLD AUTO: 5 %
NEUTROPHILS # BLD AUTO: 4.9 X10E3/UL (ref 1.4–7)
NEUTROPHILS NFR BLD AUTO: 64 %
PLATELET # BLD AUTO: 270 X10E3/UL (ref 150–450)
POTASSIUM SERPL-SCNC: 4 MMOL/L (ref 3.5–5.2)
PROT SERPL-MCNC: 7.6 G/DL (ref 6–8.5)
RBC # BLD AUTO: 4.81 X10E6/UL (ref 3.77–5.28)
SODIUM SERPL-SCNC: 139 MMOL/L (ref 134–144)
TRIGL SERPL-MCNC: 287 MG/DL (ref 0–149)
TSH SERPL DL<=0.005 MIU/L-ACNC: 0.47 UIU/ML (ref 0.45–4.5)
VLDLC SERPL CALC-MCNC: 55 MG/DL (ref 5–40)
WBC # BLD AUTO: 7.7 X10E3/UL (ref 3.4–10.8)

## 2024-04-11 PROBLEM — E11.9 TYPE 2 DIABETES MELLITUS WITHOUT COMPLICATION, WITHOUT LONG-TERM CURRENT USE OF INSULIN (HCC): Status: ACTIVE | Noted: 2024-04-11

## 2024-04-11 RX ORDER — ROSUVASTATIN CALCIUM 5 MG/1
5 TABLET, COATED ORAL DAILY
Qty: 90 TABLET | Refills: 0 | Status: SHIPPED | OUTPATIENT
Start: 2024-04-11

## 2024-04-11 RX ORDER — METFORMIN HYDROCHLORIDE 500 MG/1
500 TABLET, EXTENDED RELEASE ORAL
Qty: 90 TABLET | Refills: 0 | Status: SHIPPED | OUTPATIENT
Start: 2024-04-11

## 2024-04-11 NOTE — RESULT ENCOUNTER NOTE
Please let her know her A1C shows diabetes (this is a new diagnosis for her)  I would like for her to start metformin  mg daily (I have already sent the prescription), eat low carb, low sugar diet, and exercise.  Cholesterol is very elevated - this + high blood sugar places her at high risk of cardiovascular event.  I have sent rosuvastatin for her to start right away.  She needs to return in 6 weeks for repeat lipids and liver function test (lab only)  Lipase and CBC were normal. Has she scheduled her abdominal Ct yet? If not, please provide her the contact info.  Please also schedule her for follow up with me on or after July  for repeat labs.

## 2024-04-12 ENCOUNTER — TELEPHONE (OUTPATIENT)
Facility: CLINIC | Age: 59
End: 2024-04-12

## 2024-04-12 NOTE — TELEPHONE ENCOUNTER
Spoke with patient regarding instructions from Misti Kidd.  Patient had question about Abdominal CT scan.  Spoke with Misti regarding Rx, will contact patient back with newest information.     ANIRUDH Enrique

## 2024-04-12 NOTE — TELEPHONE ENCOUNTER
Pt called and stated that she would like to speak to the provider. Pt stated that she received notice that some medications where called in for her but she has not heard from anyone and does not know what they are and why she needs them

## 2024-04-15 ENCOUNTER — TELEPHONE (OUTPATIENT)
Facility: CLINIC | Age: 59
End: 2024-04-15

## 2024-04-15 NOTE — TELEPHONE ENCOUNTER
I called pt to get more information regarding her phone call. She was very upset that she had two prescriptions sent into the pharmacy and didn't know why? She states she didn't receive a phone call.   I went over Misti's lab note and she seemed not so much upset after I explained everything to her.  All question's answered and pt verbalized understanding.  I also explained for her to call the office to schedule a lab appt for 6 weeks and a follow up with Misti the end of July.

## 2024-05-20 PROBLEM — G89.29 OTHER CHRONIC PAIN: Status: ACTIVE | Noted: 2024-05-20

## 2024-05-22 ENCOUNTER — TELEPHONE (OUTPATIENT)
Facility: CLINIC | Age: 59
End: 2024-05-22

## 2024-05-22 DIAGNOSIS — E78.2 MIXED HYPERLIPIDEMIA: Primary | ICD-10-CM

## 2024-05-22 NOTE — TELEPHONE ENCOUNTER
----- Message from Lenora Ramirez sent at 5/22/2024  3:49 PM EDT -----  Subject: Message to Provider    QUESTIONS  Information for Provider? Pt needs to get blood drawn and states she is   closer [facility] and would like her orders sent to them.   Can you please do that and text or call pt to let her know when you send   them?  ---------------------------------------------------------------------------  --------------  CALL BACK INFO  [phone]; OK to leave message on voicemail  ---------------------------------------------------------------------------  --------------  SCRIPT ANSWERS  Relationship to Patient? Self

## 2024-05-23 DIAGNOSIS — E78.2 MIXED HYPERLIPIDEMIA: ICD-10-CM

## 2025-08-26 ENCOUNTER — TELEPHONE (OUTPATIENT)
Facility: CLINIC | Age: 60
End: 2025-08-26

## (undated) DEVICE — KENDALL DL ECG CABLE AND LEAD WIRE SYSTEM, 3-LEAD, SINGLE PATIENT USE: Brand: KENDALL

## (undated) DEVICE — PACK,EENT,TURBAN DRAPE,PK II: Brand: MEDLINE

## (undated) DEVICE — SPLINT 1524050 5PK PAIR DOYLE II AIRWAY: Brand: DOYLE II ™

## (undated) DEVICE — SUTURE CHROMIC GUT SZ 4-0 L18IN ABSRB BRN L13MM P-3 3/8 CIR 1654G

## (undated) DEVICE — 1010 S-DRAPE TOWEL DRAPE 10/BX: Brand: STERI-DRAPE™

## (undated) DEVICE — CODMAN® SURGICAL PATTIES 1/2" X 3" (1.27CM X 7.62CM): Brand: CODMAN®

## (undated) DEVICE — SOLUTION LACTATED RINGERS INJECTION USP

## (undated) DEVICE — AIRLIFE™ FACE TENT MASK VINYL: Brand: AIRLIFE™

## (undated) DEVICE — INFECTION CONTROL KIT SYS

## (undated) DEVICE — GAUZE,SPONGE,2"X2",8PLY,STERILE,LF,2'S: Brand: MEDLINE

## (undated) DEVICE — SYRINGE,EAR/ULCER, 2 OZ, STERILE: Brand: MEDLINE

## (undated) DEVICE — AIRLIFE™ CORRUGATED FLEXIBLE POLYETHYLENE AND EVA TUBING FOR AEROSOL AND IPPB USE, SEGMENTED, 6 FEET (1.8 M) LENGTH, 22 MM I.D.: Brand: AIRLIFE™

## (undated) DEVICE — KIT ADAP STERILE WATER 1000ML -- AIRLIFE

## (undated) DEVICE — BLADE OPHTH MINI BEAV SHRP --

## (undated) DEVICE — CONTAINER,SPEC,PNEUM TUBE,3OZ,STRL PATH: Brand: MEDLINE

## (undated) DEVICE — SUTURE ABSORBABLE MONOFILAMENT 4-0 SC-1 18 IN PLN GUT 1824H

## (undated) DEVICE — CONTINU-FLO SOLUTION SET, 2 INJECTION SITES, MALE LUER LOCK ADAPTER WITH RETRACTABLE COLLAR, LARGE BORE STOPCOCK WITH ROTATING MALE LUER LOCK EXTENSION SET, 2 INJECTION SITES, MALE LUER LOCK ADAPTER WITH RETRACTABLE COLLAR: Brand: INTERLINK/CONTINU-FLO

## (undated) DEVICE — 3M™ TEGADERM™ TRANSPARENT FILM DRESSING FRAME STYLE, 1624W, 2-3/8 IN X 2-3/4 IN (6 CM X 7 CM), 100/CT 4CT/CASE: Brand: 3M™ TEGADERM™

## (undated) DEVICE — KIT,1200CC CANISTER,3/16"X6' TUBING: Brand: MEDLINE INDUSTRIES, INC.

## (undated) DEVICE — SYR 10ML CTRL LR LCK NSAF LF --

## (undated) DEVICE — SOLUTION SCRB 2OZ 10% POVIDONE IOD ANTIMIC BTL

## (undated) DEVICE — SOLUTION IV 1000ML 0.9% SOD CHL

## (undated) DEVICE — SPONGE GZ W4XL4IN COT RADPQ HIGHLY ABSRB

## (undated) DEVICE — TUBING, SUCTION, 1/4" X 10', STRAIGHT: Brand: MEDLINE

## (undated) DEVICE — TOWEL SURG W17XL27IN STD BLU COT NONFENESTRATED PREWASHED

## (undated) DEVICE — NDL CTR 40/70 FM BLCK MAG: Brand: CARDINAL HEALTH

## (undated) DEVICE — SUT ETHLN 4-0 18IN PS2 BLK --

## (undated) DEVICE — BLADE 1882040 5PK INFERIOR TURB 2MM

## (undated) DEVICE — SURGIFOAM SPNG SZ 12-7

## (undated) DEVICE — SPONGE GZ W4XL4IN COT 12 PLY TYP VII WVN C FLD DSGN

## (undated) DEVICE — NEEDLE HYPO 25GA L1.5IN BVL ORIENTED ECLIPSE

## (undated) DEVICE — MARKER,SKIN,WI/RULER AND LABELS: Brand: MEDLINE

## (undated) DEVICE — STERILE POLYISOPRENE POWDER-FREE SURGICAL GLOVES: Brand: PROTEXIS